# Patient Record
Sex: MALE | Race: WHITE | Employment: OTHER | URBAN - METROPOLITAN AREA
[De-identification: names, ages, dates, MRNs, and addresses within clinical notes are randomized per-mention and may not be internally consistent; named-entity substitution may affect disease eponyms.]

---

## 2021-03-22 ENCOUNTER — APPOINTMENT (OUTPATIENT)
Dept: CT IMAGING | Age: 71
DRG: 663 | End: 2021-03-22
Payer: OTHER GOVERNMENT

## 2021-03-22 ENCOUNTER — HOSPITAL ENCOUNTER (INPATIENT)
Age: 71
LOS: 4 days | Discharge: HOME | DRG: 663 | End: 2021-03-26
Attending: EMERGENCY MEDICINE | Admitting: INTERNAL MEDICINE
Payer: OTHER GOVERNMENT

## 2021-03-22 DIAGNOSIS — N39.0 URINARY TRACT INFECTION WITH HEMATURIA, SITE UNSPECIFIED: ICD-10-CM

## 2021-03-22 DIAGNOSIS — R31.9 HEMATURIA, UNSPECIFIED TYPE: Primary | ICD-10-CM

## 2021-03-22 DIAGNOSIS — R31.9 URINARY TRACT INFECTION WITH HEMATURIA, SITE UNSPECIFIED: ICD-10-CM

## 2021-03-22 PROBLEM — Z90.2 S/P LOBECTOMY OF LUNG: Status: ACTIVE | Noted: 2021-03-22

## 2021-03-22 PROBLEM — N32.89 MASS OF URINARY BLADDER DETERMINED BY ULTRASOUND: Status: ACTIVE | Noted: 2021-03-22

## 2021-03-22 PROBLEM — N30.01 ACUTE CYSTITIS WITH HEMATURIA: Status: ACTIVE | Noted: 2021-03-22

## 2021-03-22 PROBLEM — Z87.19 H/O ULCERATIVE COLITIS: Status: ACTIVE | Noted: 2021-03-22

## 2021-03-22 PROBLEM — F43.10 PTSD (POST-TRAUMATIC STRESS DISORDER): Status: ACTIVE | Noted: 2021-03-22

## 2021-03-22 LAB
-: ABNORMAL
ABSOLUTE EOS #: 0.24 K/UL (ref 0–0.44)
ABSOLUTE IMMATURE GRANULOCYTE: 0.04 K/UL (ref 0–0.3)
ABSOLUTE LYMPH #: 1.89 K/UL (ref 1.1–3.7)
ABSOLUTE MONO #: 0.88 K/UL (ref 0.1–1.2)
AMORPHOUS: ABNORMAL
ANION GAP SERPL CALCULATED.3IONS-SCNC: 8 MMOL/L (ref 9–17)
BACTERIA: ABNORMAL
BASOPHILS # BLD: 1 % (ref 0–2)
BASOPHILS ABSOLUTE: 0.07 K/UL (ref 0–0.2)
BILIRUBIN URINE: NEGATIVE
BUN BLDV-MCNC: 16 MG/DL (ref 8–23)
BUN/CREAT BLD: 17 (ref 9–20)
CALCIUM SERPL-MCNC: 9.4 MG/DL (ref 8.6–10.4)
CASTS UA: ABNORMAL /LPF
CHLORIDE BLD-SCNC: 100 MMOL/L (ref 98–107)
CO2: 21 MMOL/L (ref 20–31)
COLOR: ABNORMAL
COMMENT UA: ABNORMAL
CREAT SERPL-MCNC: 0.92 MG/DL (ref 0.7–1.2)
CRYSTALS, UA: ABNORMAL /HPF
DIFFERENTIAL TYPE: ABNORMAL
EOSINOPHILS RELATIVE PERCENT: 2 % (ref 1–4)
EPITHELIAL CELLS UA: ABNORMAL /HPF (ref 0–5)
GFR AFRICAN AMERICAN: >60 ML/MIN
GFR NON-AFRICAN AMERICAN: >60 ML/MIN
GFR SERPL CREATININE-BSD FRML MDRD: ABNORMAL ML/MIN/{1.73_M2}
GFR SERPL CREATININE-BSD FRML MDRD: ABNORMAL ML/MIN/{1.73_M2}
GLUCOSE BLD-MCNC: 85 MG/DL (ref 70–99)
GLUCOSE URINE: ABNORMAL
HCT VFR BLD CALC: 35.8 % (ref 40.7–50.3)
HEMOGLOBIN: 12.3 G/DL (ref 13–17)
IMMATURE GRANULOCYTES: 0 %
INR BLD: 0.9
KETONES, URINE: ABNORMAL
LEUKOCYTE ESTERASE, URINE: ABNORMAL
LYMPHOCYTES # BLD: 19 % (ref 24–43)
MCH RBC QN AUTO: 32.4 PG (ref 25.2–33.5)
MCHC RBC AUTO-ENTMCNC: 34.4 G/DL (ref 28.4–34.8)
MCV RBC AUTO: 94.2 FL (ref 82.6–102.9)
MONOCYTES # BLD: 9 % (ref 3–12)
MUCUS: ABNORMAL
NITRITE, URINE: POSITIVE
NRBC AUTOMATED: 0 PER 100 WBC
OTHER OBSERVATIONS UA: ABNORMAL
PDW BLD-RTO: 12.3 % (ref 11.8–14.4)
PH UA: 8 (ref 5–8)
PLATELET # BLD: 185 K/UL (ref 138–453)
PLATELET ESTIMATE: ABNORMAL
PMV BLD AUTO: 9.3 FL (ref 8.1–13.5)
POTASSIUM SERPL-SCNC: 4.2 MMOL/L (ref 3.7–5.3)
PROCALCITONIN: 0.03 NG/ML
PROTEIN UA: ABNORMAL
PROTHROMBIN TIME: 12.4 SEC (ref 11.5–14.2)
RBC # BLD: 3.8 M/UL (ref 4.21–5.77)
RBC # BLD: ABNORMAL 10*6/UL
RBC UA: ABNORMAL /HPF (ref 0–2)
RENAL EPITHELIAL, UA: ABNORMAL /HPF
SEG NEUTROPHILS: 69 % (ref 36–65)
SEGMENTED NEUTROPHILS ABSOLUTE COUNT: 6.7 K/UL (ref 1.5–8.1)
SODIUM BLD-SCNC: 129 MMOL/L (ref 135–144)
SPECIFIC GRAVITY UA: 1.01 (ref 1–1.03)
TRICHOMONAS: ABNORMAL
TURBIDITY: ABNORMAL
URINE HGB: ABNORMAL
UROBILINOGEN, URINE: ABNORMAL
WBC # BLD: 9.8 K/UL (ref 3.5–11.3)
WBC # BLD: ABNORMAL 10*3/UL
WBC UA: ABNORMAL /HPF (ref 0–5)
YEAST: ABNORMAL

## 2021-03-22 PROCEDURE — 81001 URINALYSIS AUTO W/SCOPE: CPT

## 2021-03-22 PROCEDURE — 85025 COMPLETE CBC W/AUTO DIFF WBC: CPT

## 2021-03-22 PROCEDURE — 99223 1ST HOSP IP/OBS HIGH 75: CPT | Performed by: INTERNAL MEDICINE

## 2021-03-22 PROCEDURE — 2580000003 HC RX 258: Performed by: NURSE PRACTITIONER

## 2021-03-22 PROCEDURE — 36415 COLL VENOUS BLD VENIPUNCTURE: CPT

## 2021-03-22 PROCEDURE — 85610 PROTHROMBIN TIME: CPT

## 2021-03-22 PROCEDURE — 87086 URINE CULTURE/COLONY COUNT: CPT

## 2021-03-22 PROCEDURE — APPSS45 APP SPLIT SHARED TIME 31-45 MINUTES: Performed by: NURSE PRACTITIONER

## 2021-03-22 PROCEDURE — 6360000002 HC RX W HCPCS: Performed by: NURSE PRACTITIONER

## 2021-03-22 PROCEDURE — 80048 BASIC METABOLIC PNL TOTAL CA: CPT

## 2021-03-22 PROCEDURE — 74176 CT ABD & PELVIS W/O CONTRAST: CPT

## 2021-03-22 PROCEDURE — 51702 INSERT TEMP BLADDER CATH: CPT

## 2021-03-22 PROCEDURE — 99283 EMERGENCY DEPT VISIT LOW MDM: CPT

## 2021-03-22 PROCEDURE — 6370000000 HC RX 637 (ALT 250 FOR IP): Performed by: NURSE PRACTITIONER

## 2021-03-22 PROCEDURE — 87040 BLOOD CULTURE FOR BACTERIA: CPT

## 2021-03-22 PROCEDURE — 1200000000 HC SEMI PRIVATE

## 2021-03-22 PROCEDURE — 84145 PROCALCITONIN (PCT): CPT

## 2021-03-22 RX ORDER — PAROXETINE HYDROCHLORIDE 40 MG/1
40 TABLET, FILM COATED ORAL EVERY MORNING
COMMUNITY

## 2021-03-22 RX ORDER — METOPROLOL TARTRATE 50 MG/1
50 TABLET, FILM COATED ORAL 2 TIMES DAILY
Status: DISCONTINUED | OUTPATIENT
Start: 2021-03-22 | End: 2021-03-26 | Stop reason: HOSPADM

## 2021-03-22 RX ORDER — LIDOCAINE HYDROCHLORIDE 20 MG/ML
5 JELLY TOPICAL PRN
Status: DISCONTINUED | OUTPATIENT
Start: 2021-03-22 | End: 2021-03-26 | Stop reason: HOSPADM

## 2021-03-22 RX ORDER — ASPIRIN 81 MG/1
81 TABLET ORAL DAILY
Status: ON HOLD | COMMUNITY
End: 2021-03-26 | Stop reason: HOSPADM

## 2021-03-22 RX ORDER — PROMETHAZINE HYDROCHLORIDE 12.5 MG/1
12.5 TABLET ORAL EVERY 6 HOURS PRN
Status: DISCONTINUED | OUTPATIENT
Start: 2021-03-22 | End: 2021-03-26 | Stop reason: HOSPADM

## 2021-03-22 RX ORDER — SULFASALAZINE 500 MG/1
1000 TABLET ORAL 2 TIMES DAILY
Status: DISCONTINUED | OUTPATIENT
Start: 2021-03-22 | End: 2021-03-26 | Stop reason: HOSPADM

## 2021-03-22 RX ORDER — LISINOPRIL 20 MG/1
20 TABLET ORAL DAILY
COMMUNITY

## 2021-03-22 RX ORDER — POTASSIUM CHLORIDE 7.45 MG/ML
10 INJECTION INTRAVENOUS PRN
Status: DISCONTINUED | OUTPATIENT
Start: 2021-03-22 | End: 2021-03-26 | Stop reason: HOSPADM

## 2021-03-22 RX ORDER — SODIUM CHLORIDE 0.9 % (FLUSH) 0.9 %
10 SYRINGE (ML) INJECTION PRN
Status: DISCONTINUED | OUTPATIENT
Start: 2021-03-22 | End: 2021-03-26 | Stop reason: HOSPADM

## 2021-03-22 RX ORDER — ACETAMINOPHEN 325 MG/1
650 TABLET ORAL EVERY 6 HOURS PRN
Status: DISCONTINUED | OUTPATIENT
Start: 2021-03-22 | End: 2021-03-26 | Stop reason: HOSPADM

## 2021-03-22 RX ORDER — MONTELUKAST SODIUM 4 MG/1
5 TABLET, CHEWABLE ORAL 2 TIMES DAILY
COMMUNITY

## 2021-03-22 RX ORDER — ONDANSETRON 2 MG/ML
4 INJECTION INTRAMUSCULAR; INTRAVENOUS EVERY 6 HOURS PRN
Status: DISCONTINUED | OUTPATIENT
Start: 2021-03-22 | End: 2021-03-26 | Stop reason: HOSPADM

## 2021-03-22 RX ORDER — SODIUM CHLORIDE 9 MG/ML
INJECTION, SOLUTION INTRAVENOUS CONTINUOUS
Status: DISCONTINUED | OUTPATIENT
Start: 2021-03-22 | End: 2021-03-26 | Stop reason: HOSPADM

## 2021-03-22 RX ORDER — DOXAZOSIN 2 MG/1
2 TABLET ORAL NIGHTLY
COMMUNITY

## 2021-03-22 RX ORDER — OMEGA-3 FATTY ACIDS CAP DELAYED RELEASE 1000 MG 1000 MG
1000 CAPSULE DELAYED RELEASE ORAL 2 TIMES DAILY
COMMUNITY

## 2021-03-22 RX ORDER — MAGNESIUM SULFATE 1 G/100ML
1000 INJECTION INTRAVENOUS PRN
Status: DISCONTINUED | OUTPATIENT
Start: 2021-03-22 | End: 2021-03-26 | Stop reason: HOSPADM

## 2021-03-22 RX ORDER — DOXAZOSIN MESYLATE 1 MG/1
1 TABLET ORAL NIGHTLY
Status: DISCONTINUED | OUTPATIENT
Start: 2021-03-22 | End: 2021-03-26 | Stop reason: HOSPADM

## 2021-03-22 RX ORDER — POLYETHYLENE GLYCOL 3350 17 G/17G
17 POWDER, FOR SOLUTION ORAL DAILY PRN
Status: DISCONTINUED | OUTPATIENT
Start: 2021-03-22 | End: 2021-03-26 | Stop reason: HOSPADM

## 2021-03-22 RX ORDER — SULFASALAZINE 500 MG/1
1000 TABLET ORAL 2 TIMES DAILY
COMMUNITY

## 2021-03-22 RX ORDER — PAROXETINE HYDROCHLORIDE 20 MG/1
40 TABLET, FILM COATED ORAL EVERY MORNING
Status: DISCONTINUED | OUTPATIENT
Start: 2021-03-23 | End: 2021-03-26 | Stop reason: HOSPADM

## 2021-03-22 RX ORDER — ACETAMINOPHEN 650 MG/1
650 SUPPOSITORY RECTAL EVERY 6 HOURS PRN
Status: DISCONTINUED | OUTPATIENT
Start: 2021-03-22 | End: 2021-03-26 | Stop reason: HOSPADM

## 2021-03-22 RX ORDER — LISINOPRIL 20 MG/1
20 TABLET ORAL DAILY
Status: DISCONTINUED | OUTPATIENT
Start: 2021-03-22 | End: 2021-03-26 | Stop reason: HOSPADM

## 2021-03-22 RX ORDER — SODIUM CHLORIDE 0.9 % (FLUSH) 0.9 %
10 SYRINGE (ML) INJECTION EVERY 12 HOURS SCHEDULED
Status: DISCONTINUED | OUTPATIENT
Start: 2021-03-22 | End: 2021-03-26 | Stop reason: HOSPADM

## 2021-03-22 RX ORDER — MORPHINE SULFATE 2 MG/ML
2 INJECTION, SOLUTION INTRAMUSCULAR; INTRAVENOUS EVERY 4 HOURS PRN
Status: DISCONTINUED | OUTPATIENT
Start: 2021-03-22 | End: 2021-03-26 | Stop reason: HOSPADM

## 2021-03-22 RX ORDER — POTASSIUM CHLORIDE 20 MEQ/1
40 TABLET, EXTENDED RELEASE ORAL PRN
Status: DISCONTINUED | OUTPATIENT
Start: 2021-03-22 | End: 2021-03-26 | Stop reason: HOSPADM

## 2021-03-22 RX ORDER — NICOTINE 21 MG/24HR
1 PATCH, TRANSDERMAL 24 HOURS TRANSDERMAL DAILY PRN
Status: DISCONTINUED | OUTPATIENT
Start: 2021-03-22 | End: 2021-03-26 | Stop reason: HOSPADM

## 2021-03-22 RX ORDER — METOPROLOL TARTRATE 50 MG/1
50 TABLET, FILM COATED ORAL 2 TIMES DAILY
COMMUNITY

## 2021-03-22 RX ADMIN — METOPROLOL TARTRATE 50 MG: 50 TABLET, FILM COATED ORAL at 19:38

## 2021-03-22 RX ADMIN — SULFASALAZINE 1000 MG: 500 TABLET ORAL at 19:38

## 2021-03-22 RX ADMIN — SODIUM CHLORIDE: 9 INJECTION, SOLUTION INTRAVENOUS at 23:00

## 2021-03-22 RX ADMIN — DOXAZOSIN 1 MG: 1 TABLET ORAL at 19:38

## 2021-03-22 RX ADMIN — CEFTRIAXONE SODIUM 1000 MG: 1 INJECTION, POWDER, FOR SOLUTION INTRAMUSCULAR; INTRAVENOUS at 17:36

## 2021-03-22 RX ADMIN — MORPHINE SULFATE 2 MG: 2 INJECTION, SOLUTION INTRAMUSCULAR; INTRAVENOUS at 19:38

## 2021-03-22 SDOH — HEALTH STABILITY: MENTAL HEALTH: HOW MANY STANDARD DRINKS CONTAINING ALCOHOL DO YOU HAVE ON A TYPICAL DAY?: 1 OR 2

## 2021-03-22 ASSESSMENT — PAIN DESCRIPTION - ONSET: ONSET: SUDDEN

## 2021-03-22 ASSESSMENT — ENCOUNTER SYMPTOMS
NAUSEA: 0
SINUS PRESSURE: 0
COUGH: 0
SHORTNESS OF BREATH: 0
WHEEZING: 0
ABDOMINAL DISTENTION: 1
CONSTIPATION: 0
EYE DISCHARGE: 0
SORE THROAT: 0
DIARRHEA: 0
SINUS PAIN: 0
RHINORRHEA: 0
ABDOMINAL PAIN: 1
EYE REDNESS: 0
ABDOMINAL PAIN: 0
VOMITING: 0
COLOR CHANGE: 0

## 2021-03-22 ASSESSMENT — PAIN DESCRIPTION - PROGRESSION: CLINICAL_PROGRESSION: GRADUALLY IMPROVING

## 2021-03-22 ASSESSMENT — PAIN DESCRIPTION - FREQUENCY: FREQUENCY: CONTINUOUS

## 2021-03-22 ASSESSMENT — PAIN DESCRIPTION - PAIN TYPE: TYPE: ACUTE PAIN

## 2021-03-22 ASSESSMENT — PAIN SCALES - GENERAL
PAINLEVEL_OUTOF10: 5
PAINLEVEL_OUTOF10: 6

## 2021-03-22 NOTE — CONSULTS
Stephie Villatoro  Urology Consultation    Patient:  Peace Willis  MRN: [id number]  YOB: 1950    CHIEF COMPLAINT: Hemorrhagic cystitis, urinary retention, questionable bladder mass, solitary kidney, prior nephrectomy    HISTORY OF PRESENT ILLNESS:   The patient is a 79 y.o. male who presents with gross hematuria, patient having indwelling Stone at the present time with bladder irrigation for gross hematuria    CT imaging reviewed, the patient has a solitary right kidney, with moderate hydronephrosis and mild hydroureter without obstruction. A swirling moderately hyperdense material in the urinary bladder measuring 8 cm in the dependent part of the urinary bladder, hematoma versus mass. The patient has kept follow-ups at the [facility] in [place], we discussed with the patient the episode of bleeding the need for cystoscopy. He would like to continue his follow-up up [facility],    At the present time Stone catheter in place with bladder irrigation, findings of blood clots most likely in the bladder    Patient's old records, notes and chart reviewed and summarized above.     Past Medical History:    Past Medical History:   Diagnosis Date    Arthritis     CAD (coronary artery disease)     Cancer ([address]     Chronic kidney disease     Depression     History of kidney cancer     Hyperlipidemia     Hypertension        Past Surgical History:    Past Surgical History:   Procedure Laterality Date    CARDIAC SURGERY      CHOLECYSTECTOMY      CORONARY ANGIOPLASTY WITH STENT PLACEMENT      EYE SURGERY      KIDNEY REMOVAL Left     KNEE SURGERY      LUNG REMOVAL, PARTIAL Right     middle lob    SKIN BIOPSY       Previous  surgery: Nephrectomy, patient follows up at the [facility] in [place]     Medications:    Scheduled Meds:   cefTRIAXone (ROCEPHIN) IV  1,000 mg Intravenous Once    [START ON 3/23/2021] cefTRIAXone (ROCEPHIN) IV  1,000 mg Intravenous Q24H     Continuous Infusions:  PRN Meds:.lidocaine    Allergies:  Patient has no known allergies.     Social History:    Social History     Socioeconomic History    Marital status:      Spouse name: Not on file    Number of children: Not on file    Years of education: Not on file    Highest education level: Not on file   Occupational History    Not on file   Social Needs    Financial resource strain: Not on file    Food insecurity     Worry: Not on file     Inability: Not on file    Transportation needs     Medical: Not on file     Non-medical: Not on file   Tobacco Use    Smoking status: Current Every Day Smoker     Packs/day: 1.00    Smokeless tobacco: Never Used   Substance and Sexual Activity    Alcohol use: Yes     Frequency: Monthly or less     Drinks per session: 1 or 2     Comment: socially    Drug use: Never    Sexual activity: Not on file   Lifestyle    Physical activity     Days per week: Not on file     Minutes per session: Not on file    Stress: Not on file   Relationships    Social connections     Talks on phone: Not on file     Gets together: Not on file     Attends Restorationist service: Not on file     Active member of club or organization: Not on file     Attends meetings of clubs or organizations: Not on file     Relationship status: Not on file    Intimate partner violence     Fear of current or ex partner: Not on file     Emotionally abused: Not on file     Physically abused: Not on file     Forced sexual activity: Not on file   Other Topics Concern    Not on file   Social History Narrative    Not on file       Family History:    Family History   Problem Relation Age of Onset    High Blood Pressure Mother     Coronary Art Dis Father     Heart Attack Father     High Blood Pressure Father     Diabetes Father      Previous Urologic Family history: none    REVIEW OF SYSTEMS:  Constitutional: negative  Eyes: negative  Respiratory: negative  Cardiovascular: negative  Gastrointestinal: negative  Genitourinary: see HPI  Musculoskeletal: negative  Skin: negative   Neurological: negative  Hematological/Lymphatic: negative  Psychological: negative    Physical Exam:    This a 79 y.o. male   Patient Vitals for the past 24 hrs:   BP Temp Temp src Pulse Resp SpO2 Height Weight   03/22/21 1326 (!) 152/72 97.7 °F (36.5 °C) Oral 63 18 98 % -- --   03/22/21 1324 -- -- -- -- -- -- 5' 6\" (1.676 m) 202 lb (91.6 kg)     Constitutional: Patient in no acute distress; Neuro: alert and oriented to person place and time. Psych: Mood and affect normal.  Skin: Normal  Lungs: Respiratory effort normal  Cardiovascular:  Normal peripheral pulses  Abdomen: Soft, non-tender, non-distended with no CVA, flank pain, hepatosplenomegaly or hernia. Kidneys normal.  Bladder non-tender and not distended.   Lymphatics: no palpable lymphadenopathy  Penis normal and circumcised  Urethral meatus normal  Scrotal exam normal  Stone catheter in place, bladder decompressed, bladder irrigation in progress, urine still retention, no clots  LABS:  Recent Labs     03/22/21  1445   WBC 9.8   HGB 12.3*   HCT 35.8*   MCV 94.2        Recent Labs     03/22/21  1445   *   K 4.2      CO2 21   BUN 16   CREATININE 0.92     No results found for: PSA    Additional Lab/culture results:    Urinalysis:   Recent Labs     03/22/21  1350   COLORU RED*   PHUR 8.0   WBCUA 10 TO 20   RBCUA TOO NUMEROUS TO COUNT   MUCUS NOT REPORTED   TRICHOMONAS NOT REPORTED   YEAST NOT REPORTED   BACTERIA NOT REPORTED   SPECGRAV 1.010   LEUKOCYTESUR LARGE*   UROBILINOGEN ELEVATED*   BILIRUBINUR NEGATIVE        -----------------------------------------------------------------  Imaging Results:    Assessment and Plan   Impression:    Patient Active Problem List   Diagnosis    History of kidney cancer    Chronic kidney disease    CAD (coronary artery disease)    Depression    Hyperlipidemia    Hypertension    H/O ulcerative colitis    Acute cystitis with hematuria    PTSD (post-traumatic stress disorder)    S/P lobectomy of lung    Hematoma versus mass of urinary bladder determined by ultrasound    Hematuria       Plan: We will continue with bladder irrigation today, the option of catheter removal when the urine is clear and follow-up cystoscopy in [facility] at the [address] [place] [address] can be exercised since the patient has had his follow-ups up [facility].     Alternatively a cystoscopy could be done locally depending on the patient's symptoms we will follow closely    Ricardo Miranda  5:38 PM 3/22/2021

## 2021-03-22 NOTE — ED PROVIDER NOTES
eMERGENCY dEPARTMENT eNCOUnter   Independent Attestation     Pt Name: Nancy Madrigal  MRN: [id number]  [place] 1950  Date of evaluation: 3/22/21     Nancy Madrigal is a 79 y.o. male with CC: Abdominal Pain (onset 1000) and Hematuria      Based on the medical record the care appears appropriate. I was personally available for consultation in the Emergency Department.     Elena Polanco MD  Attending Emergency Physician                    Elena Polanco MD  03/22/21 9093

## 2021-03-22 NOTE — PROGRESS NOTES
Admitted to room 2022 from ER, oriented to room and equipment. Dr Radha Bliss at bedside. 3 way morales in place with saline CBI infusing.  Returns are cherry colored

## 2021-03-22 NOTE — H&P
[facility]  Office: [address], DO, Savage Ledesma, DO, Francisca Moeller, DO, williams Arely Ferguson, DO, Peterson Fields MD, Oddis Nissen, MD, Jessica Cunningham MD, Candace Conway MD, Jocelin Morelos MD, Mario Kan MD, Anna Resendez MD, Carlos Monahan MD, Mahnaz Garland MD, Idris Victoria DO, Eloisa Solomon MD, Juan C Block, DO, Arianna Bonilla MD,  Zahra Chaudhary DO, Santiago Gomez MD, Kay Acuna MD, Brigida Paul [facility], [facility], CNP, Raheel Roche, CNP, Riri Ibarra, CNS, Stephanie Barnett, CNP, Archana Franklin, CNP, Diana Cox, CNP, Zoe Greenwood, [facility], Vivienne Downing, CNP, Ed Everett PA-C, Sandra Ladd, APRIL, Yajaira Royal, CNP, Suha Hatch, CNP, Melissa Maloney, CNP, Yue Patiño, CNP, Mukund Moore, CNP, Benny Gayle, CNP         [facility] [address]    HISTORY AND PHYSICAL EXAMINATION            Date:   3/22/2021  Patient name:  Peace Willis  Date of admission:  3/22/2021  1:56 PM  MRN:   [id number]  Account:  [de-identified]  YOB: 1950  PCP:    No primary care provider on file. Room:   [place] [address]  Code Status:    No Order    Chief Complaint:     Chief Complaint   Patient presents with    Abdominal Pain     onset 1000    Hematuria       History Obtained From:     patient    History of Present Illness:     Peace Willis is a 79 y.o. Non-/non  male who presents with Abdominal Pain (onset 1000) and Hematuria   and is admitted to the hospital for the management of Acute cystitis with hematuria. Patient reports to the hospital with abdominal pain and hematuria. Patient reports that he has a long history of urinary system complications. Patient has a history of kidney cancer resulting in nephrectomy of the left as well as a persistent renal cyst to the right kidney that gets routine surveillance scans. Patient also has a history of prostate enlargement requiring TURP.   [facility] completed by [place] [facility]. Patient reports that at approximately 10 AM he went to urinate and appreciated a large amount of hematuria including mary blood as well as large clots. Shortly thereafter patient developed suprapubic abdominal pain that he rated at severe and would come and go in waves. Patient reported the emergency department where a CT scan was completed and questionable bladder hematoma versus mass was identified. Stone catheter was placed and patient was placed on three-way irrigation. Urinalysis reveals UTI. At the time my exam patient was resting comfortably. Three-way Stone catheter is in place. Patient has large amount of mary blood irrigating out the catheter. IV antibiotics have been initiated. Urology consultation was obtained and we await their input. Past Medical History:     Past Medical History:   Diagnosis Date    Arthritis     CAD (coronary artery disease)     [facility] ([facility] [address]     Chronic kidney disease     Depression     History of kidney cancer     Hyperlipidemia     Hypertension         Past Surgical History:     Past Surgical History:   Procedure Laterality Date    CARDIAC SURGERY      CHOLECYSTECTOMY      CORONARY ANGIOPLASTY WITH STENT PLACEMENT      EYE SURGERY      KIDNEY REMOVAL Left     KNEE SURGERY      LUNG REMOVAL, PARTIAL Right     middle lob    SKIN BIOPSY          Medications Prior to Admission:     Prior to Admission medications    Medication Sig Start Date End Date Taking?  Authorizing Provider   aspirin 81 MG EC tablet Take 81 mg by mouth daily   Yes Historical Provider, MD   Omega-3 Fatty Acids (FISH OIL) 1000 MG CPDR Take 1,000 mg by mouth daily   Yes Historical Provider, MD   PARoxetine (PAXIL) 40 MG tablet Take 40 mg by mouth every morning   Yes Historical Provider, MD   metoprolol tartrate (LOPRESSOR) 50 MG tablet Take 50 mg by mouth 2 times daily   Yes Historical Provider, MD   lisinopril (PRINIVIL;ZESTRIL) 20 MG tablet Take 20 mg by mouth daily   Yes Historical Provider, MD   sulfaSALAzine (SULFAZINE PO) Take 2 tablets by mouth every morning   Yes Historical Provider, MD   colestipol (COLESTID) 5 g packet Take 5 g by mouth 2 times daily   Yes Historical Provider, MD   Ascorbic Acid (VITAMIN C PO) Take by mouth   Yes Historical Provider, MD   VITAMIN D PO Take by mouth   Yes Historical Provider, MD   DOXAZOSIN MESYLATE PO Take by mouth nightly   Yes Historical Provider, MD   Multiple Vitamins-Minerals (MULTIVITAL PO) Take by mouth   Yes Historical Provider, MD        Allergies:     Patient has no known allergies. Social History:     Tobacco:    reports that he has been smoking. He has been smoking about 1.00 pack per day. He has never used smokeless tobacco.  Alcohol:      reports current alcohol use. Drug Use:  reports no history of drug use. Family History:     Family History   Problem Relation Age of Onset    High Blood Pressure Mother     Coronary Art Dis Father     Heart Attack Father     High Blood Pressure Father     Diabetes Father        Review of Systems:     Positive and Negative as described in HPI. Review of Systems   Constitutional: Negative for activity change, appetite change, fatigue and fever. HENT: Negative for congestion, sinus pressure and sinus pain. Eyes: Negative for discharge, redness and visual disturbance. Respiratory: Negative for shortness of breath and wheezing. Cardiovascular: Negative for chest pain, palpitations and leg swelling. Gastrointestinal: Positive for abdominal distention and abdominal pain. Endocrine: Negative for cold intolerance and heat intolerance. Genitourinary: Positive for difficulty urinating, hematuria and urgency. Negative for flank pain. Musculoskeletal: Negative for arthralgias and myalgias. Skin: Negative for color change and rash.    Allergic/Immunologic: Negative for environmental allergies, food allergies and General: No swelling, tenderness or deformity. Skin:     General: Skin is warm and dry. Capillary Refill: Capillary refill takes less than 2 seconds. Coloration: Skin is not jaundiced or pale. Findings: No bruising. Neurological:      General: No focal deficit present. Mental Status: He is alert and oriented to person, place, and time. Mental status is at baseline. Psychiatric:         Mood and Affect: Mood normal.         Behavior: Behavior normal.         Investigations:      Laboratory Testing:  Recent Results (from the past 24 hour(s))   Urinalysis with Microscopic    Collection Time: 03/22/21  1:50 PM   Result Value Ref Range    Color, UA RED (A) YELLOW    Turbidity UA TURBID (A) CLEAR    Glucose, Ur 1+ (A) NEGATIVE    Bilirubin Urine NEGATIVE NEGATIVE    Ketones, Urine 2+ (A) NEGATIVE    Specific Gravity, UA 1.010 1.005 - 1.030    Urine Hgb 3+ (A) NEGATIVE    pH, UA 8.0 5.0 - 8.0    Protein, UA 3+ (A) NEGATIVE    Urobilinogen, Urine ELEVATED (A) Normal    Nitrite, Urine POSITIVE (A) NEGATIVE    Leukocyte Esterase, Urine LARGE (A) NEGATIVE    Urinalysis Comments NOT REPORTED     -          WBC, UA 10 TO 20 0 - 5 /HPF    RBC, UA TOO NUMEROUS TO COUNT 0 - 2 /HPF    Casts UA NOT REPORTED /LPF    Crystals, UA NOT REPORTED None /HPF    Epithelial Cells UA None 0 - 5 /HPF    Renal Epithelial, UA NOT REPORTED 0 /HPF    Bacteria, UA NOT REPORTED None    Mucus, UA NOT REPORTED None    Trichomonas, UA NOT REPORTED None    Amorphous, UA NOT REPORTED None    Other Observations UA NOT REPORTED NOT REQ.     Yeast, UA NOT REPORTED None   CBC Auto Differential    Collection Time: 03/22/21  2:45 PM   Result Value Ref Range    WBC 9.8 3.5 - 11.3 k/uL    RBC 3.80 (L) 4.21 - 5.77 m/uL    Hemoglobin 12.3 (L) 13.0 - 17.0 g/dL    Hematocrit 35.8 (L) 40.7 - 50.3 %    MCV 94.2 82.6 - 102.9 fL    MCH 32.4 25.2 - 33.5 pg    MCHC 34.4 28.4 - 34.8 g/dL    RDW 12.3 11.8 - 14.4 %    Platelets 407 858 - 178 k/uL    MPV 9.3 8.1 - 13.5 fL    NRBC Automated 0.0 0.0 per 100 WBC    Differential Type NOT REPORTED     Seg Neutrophils 69 (H) 36 - 65 %    Lymphocytes 19 (L) 24 - 43 %    Monocytes 9 3 - 12 %    Eosinophils % 2 1 - 4 %    Basophils 1 0 - 2 %    Immature Granulocytes 0 0 %    Segs Absolute 6.70 1.50 - 8.10 k/uL    Absolute Lymph # 1.89 1.10 - 3.70 k/uL    Absolute Mono # 0.88 0.10 - 1.20 k/uL    Absolute Eos # 0.24 0.00 - 0.44 k/uL    Basophils Absolute 0.07 0.00 - 0.20 k/uL    Absolute Immature Granulocyte 0.04 0.00 - 0.30 k/uL    WBC Morphology NOT REPORTED     RBC Morphology NOT REPORTED     Platelet Estimate NOT REPORTED    Basic Metabolic Panel    Collection Time: 03/22/21  2:45 PM   Result Value Ref Range    Glucose 85 70 - 99 mg/dL    BUN 16 8 - 23 mg/dL    CREATININE 0.92 0.70 - 1.20 mg/dL    Bun/Cre Ratio 17 9 - 20    Calcium 9.4 8.6 - 10.4 mg/dL    Sodium 129 (L) 135 - 144 mmol/L    Potassium 4.2 3.7 - 5.3 mmol/L    Chloride 100 98 - 107 mmol/L    CO2 21 20 - 31 mmol/L    Anion Gap 8 (L) 9 - 17 mmol/L    GFR Non-African American >60 >60 mL/min    GFR African American >60 >60 mL/min    GFR Comment          GFR Staging NOT REPORTED    Protime-INR    Collection Time: 03/22/21  2:45 PM   Result Value Ref Range    Protime 12.4 11.5 - 14.2 sec    INR 0.9        Imaging/Diagnostics:  Ct Abdomen Pelvis Wo Contrast Additional Contrast? None    Result Date: 3/22/2021  1. Solitary right kidney demonstrating moderate hydronephrosis and mild hydroureter without obstructing ureteric lesion. 2. A swirling moderately hyperdense material in the urinary bladder about 8 x 8 cm in the dependent portion. Hematoma versus mass with hemorrhage. 3. Mild prostatomegaly.        Assessment :      Hospital Problems           Last Modified POA    * (Principal) Acute cystitis with hematuria 3/22/2021 Yes    History of kidney cancer 3/22/2021 Yes    Chronic kidney disease 3/22/2021 Yes    CAD (coronary artery disease) 3/22/2021 Yes Depression 3/22/2021 Yes    Hyperlipidemia 3/22/2021 Yes    Hypertension 3/22/2021 Yes    H/O ulcerative colitis 3/22/2021 Yes    PTSD (post-traumatic stress disorder) 3/22/2021 Yes    S/P lobectomy of lung 3/22/2021 Yes          Plan:     Patient status inpatient in the  Med/[facility]ge    1. Acute cystitis with hematuria and urinary retention  1. IV hydration with IV Rocephin every 24 hours  2. Maintain three-way Stone catheter and irrigation as directed by urology  3. Urology consultation and anticipate cystoscopy, patient is to be n.p.o. after midnight  4. Hold anticoagulation due to evidence of active hemorrhage, no DVT prophylaxis, hold aspirin for now. 2. Known history of kidney cancer and prostate enlargement  1. Urology consultation to direct further evaluation  3. CKD  1. Closely monitor renal function and provide IV hydration   2. If renal function deteriorates hold lisinopril  4. CAD  1. Education on the need for tobacco abstinence  2. Hold aspirin due to evidence for active hemorrhage  5. Depression and PTSD  1. Paxil  6. Hypertension and hyperlipidemia  1. Lisinopril, metoprolol, hold aspirin  2. Education on need for diet lifestyle modifications including low-fat low sugar diet and smoking abstinence  7. Ulcerative colitis  1. SulfaSALAZINE      Consultations:   IP CONSULT TO UROLOGY  IP CONSULT TO INTERNAL MEDICINE    Patient is admitted as inpatient status because of co-morbidities listed above, severity of signs and symptoms as outlined, requirement for current medical therapies and most importantly because of direct risk to patient if care not provided in a hospital setting. Expected length of stay > 48 hours. DIXON Morrison NP  3/22/2021  5:02 PM    Copy sent to Dr. White Chimera primary care provider on file.

## 2021-03-22 NOTE — ED NOTES
Attempted to call report at this time per Genet George RN will call back as soon as she is available.      Claudeen Rota, RN  03/22/21 3644

## 2021-03-23 LAB
ANION GAP SERPL CALCULATED.3IONS-SCNC: 9 MMOL/L
BUN BLDV-MCNC: 15 MG/DL (ref 8–23)
BUN/CREAT BLD: 16 (ref 9–20)
CALCIUM SERPL-MCNC: 9 MG/DL (ref 8.6–10.4)
CHLORIDE BLD-SCNC: 108 MMOL/L (ref 98–107)
CO2: 23 MMOL/L (ref 20–31)
CREAT SERPL-MCNC: 0.91 MG/DL (ref 0.7–1.2)
CULTURE: NO GROWTH
GFR AFRICAN AMERICAN: >60 ML/MIN
GFR NON-AFRICAN AMERICAN: >60 ML/MIN
GFR SERPL CREATININE-BSD FRML MDRD: ABNORMAL ML/MIN/{1.73_M2}
GFR SERPL CREATININE-BSD FRML MDRD: ABNORMAL ML/MIN/{1.73_M2}
GLUCOSE BLD-MCNC: 105 MG/DL (ref 70–99)
HCT VFR BLD CALC: 33.5 % (ref 40.7–50.3)
HEMOGLOBIN: 11.4 G/DL (ref 13–17)
INR BLD: 1.1
Lab: NORMAL
MCH RBC QN AUTO: 31.8 PG (ref 25.2–33.5)
MCHC RBC AUTO-ENTMCNC: 34 G/DL (ref 28.4–34.8)
MCV RBC AUTO: 93.3 FL (ref 82.6–102.9)
NRBC AUTOMATED: 0 PER 100 WBC
PDW BLD-RTO: 12.2 % (ref 11.8–14.4)
PLATELET # BLD: 192 K/UL (ref 138–453)
PMV BLD AUTO: 9.5 FL (ref 8.1–13.5)
POTASSIUM SERPL-SCNC: 4.2 MMOL/L (ref 3.7–5.3)
PROTHROMBIN TIME: 13.8 SEC (ref 11.5–14.2)
RBC # BLD: 3.59 M/UL (ref 4.21–5.77)
SARS-COV-2, RAPID: NOT DETECTED
SODIUM BLD-SCNC: 140 MMOL/L (ref 135–144)
SPECIMEN DESCRIPTION: NORMAL
SPECIMEN DESCRIPTION: NORMAL
WBC # BLD: 12.3 K/UL (ref 3.5–11.3)

## 2021-03-23 PROCEDURE — 97530 THERAPEUTIC ACTIVITIES: CPT

## 2021-03-23 PROCEDURE — 51700 IRRIGATION OF BLADDER: CPT

## 2021-03-23 PROCEDURE — 85027 COMPLETE CBC AUTOMATED: CPT

## 2021-03-23 PROCEDURE — 2709999900 HC NON-CHARGEABLE SUPPLY: Performed by: UROLOGY

## 2021-03-23 PROCEDURE — 88120 CYTP URNE 3-5 PROBES EA SPEC: CPT

## 2021-03-23 PROCEDURE — 2580000003 HC RX 258: Performed by: UROLOGY

## 2021-03-23 PROCEDURE — 2580000003 HC RX 258: Performed by: NURSE PRACTITIONER

## 2021-03-23 PROCEDURE — 6370000000 HC RX 637 (ALT 250 FOR IP): Performed by: NURSE PRACTITIONER

## 2021-03-23 PROCEDURE — 97162 PT EVAL MOD COMPLEX 30 MIN: CPT

## 2021-03-23 PROCEDURE — APPSS45 APP SPLIT SHARED TIME 31-45 MINUTES: Performed by: NURSE PRACTITIONER

## 2021-03-23 PROCEDURE — 88304 TISSUE EXAM BY PATHOLOGIST: CPT

## 2021-03-23 PROCEDURE — 99232 SBSQ HOSP IP/OBS MODERATE 35: CPT | Performed by: FAMILY MEDICINE

## 2021-03-23 PROCEDURE — 3600000012 HC SURGERY LEVEL 2 ADDTL 15MIN: Performed by: UROLOGY

## 2021-03-23 PROCEDURE — 6370000000 HC RX 637 (ALT 250 FOR IP): Performed by: UROLOGY

## 2021-03-23 PROCEDURE — 36415 COLL VENOUS BLD VENIPUNCTURE: CPT

## 2021-03-23 PROCEDURE — 3600000002 HC SURGERY LEVEL 2 BASE: Performed by: UROLOGY

## 2021-03-23 PROCEDURE — 1200000000 HC SEMI PRIVATE

## 2021-03-23 PROCEDURE — 51702 INSERT TEMP BLADDER CATH: CPT

## 2021-03-23 PROCEDURE — 80048 BASIC METABOLIC PNL TOTAL CA: CPT

## 2021-03-23 PROCEDURE — 0TCB8ZZ EXTIRPATION OF MATTER FROM BLADDER, VIA NATURAL OR ARTIFICIAL OPENING ENDOSCOPIC: ICD-10-PCS | Performed by: UROLOGY

## 2021-03-23 PROCEDURE — 87635 SARS-COV-2 COVID-19 AMP PRB: CPT

## 2021-03-23 PROCEDURE — 85610 PROTHROMBIN TIME: CPT

## 2021-03-23 PROCEDURE — 97116 GAIT TRAINING THERAPY: CPT

## 2021-03-23 RX ORDER — LIDOCAINE HYDROCHLORIDE 20 MG/ML
JELLY TOPICAL PRN
Status: DISCONTINUED | OUTPATIENT
Start: 2021-03-23 | End: 2021-03-23 | Stop reason: HOSPADM

## 2021-03-23 RX ORDER — LIDOCAINE HYDROCHLORIDE 20 MG/ML
JELLY TOPICAL PRN
Status: DISCONTINUED | OUTPATIENT
Start: 2021-03-23 | End: 2021-03-26 | Stop reason: HOSPADM

## 2021-03-23 RX ORDER — ASCORBIC ACID 500 MG
500 TABLET ORAL EVERY MORNING
COMMUNITY

## 2021-03-23 RX ORDER — TRAZODONE HYDROCHLORIDE 100 MG/1
100 TABLET ORAL NIGHTLY
COMMUNITY

## 2021-03-23 RX ADMIN — SULFASALAZINE 1000 MG: 500 TABLET ORAL at 20:33

## 2021-03-23 RX ADMIN — SODIUM CHLORIDE: 9 INJECTION, SOLUTION INTRAVENOUS at 20:39

## 2021-03-23 RX ADMIN — SODIUM CHLORIDE: 9 INJECTION, SOLUTION INTRAVENOUS at 05:24

## 2021-03-23 RX ADMIN — LIDOCAINE HYDROCHLORIDE: 20 JELLY TOPICAL at 11:49

## 2021-03-23 RX ADMIN — DOXAZOSIN 1 MG: 1 TABLET ORAL at 20:33

## 2021-03-23 RX ADMIN — METOPROLOL TARTRATE 50 MG: 50 TABLET, FILM COATED ORAL at 20:33

## 2021-03-23 RX ADMIN — LISINOPRIL 20 MG: 20 TABLET ORAL at 08:37

## 2021-03-23 RX ADMIN — PAROXETINE HYDROCHLORIDE 40 MG: 20 TABLET, FILM COATED ORAL at 08:37

## 2021-03-23 RX ADMIN — SULFASALAZINE 1000 MG: 500 TABLET ORAL at 08:37

## 2021-03-23 RX ADMIN — METOPROLOL TARTRATE 50 MG: 50 TABLET, FILM COATED ORAL at 08:37

## 2021-03-23 ASSESSMENT — PAIN SCALES - GENERAL
PAINLEVEL_OUTOF10: 0

## 2021-03-23 NOTE — PROGRESS NOTES
[facility]  Office: [phone]  Bre Love DO, Prasad Pinon DO, Kenton Jj, DO, Clark Ferguson, DO, Caitlin Rivas MD, Hakan Conrad MD, Erik Shin MD, Sotero Hendricks MD, Shimon Cardoso MD, Coleen Bliss MD, Fide Stanley MD, Mary Olmstead MD, Mahnaz Mercedes MD, Viral King DO, Melvin Concepcion MD, Kal Browning, DO, Mario Willett MD,  Maddie Ambriz DO, Janelle Ortega MD, Clemencia Menezes MD, Sophia Watts [facility], [facility], [facility], Jesús Ireland, CNP, Swati Israel, CNS, Mel Bartlett, CNP, Josef Gómez, CNP, Martita Boyer, CNP, Claritza Oscar, CNP, Rajani Garsia, CNP, Erick Boogie PA-C, Andrzej Garcia, [facility], Lilibeth Khan, CNP, Lois Rose, CNP, Maged Humphries, CNP, Tobi Clark, CNP, Michael Ramirez, [facility], Romero Acosta, [facility]    Progress Note    3/23/2021    11:28 AM    Name:   Fara Storm  MRN:     [id number]     Acct:      [de-identified]   Room:   2022/2022-01   Day:  1  Admit Date:  3/22/2021  1:56 PM    PCP:   No primary care provider on file. Code Status:  Full Code    Subjective:     C/C:   Chief Complaint   Patient presents with    Abdominal Pain     onset 1000    Hematuria     Interval History Status: not changed. Little change in condition. Patient continues to endorse suprapubic discomfort intermittently and his three-way Stone catheter routinely needs flushed due to clotting. Three-way saline return as bloody with clots. It is less hemoconcentrated than yesterday. Urology consultation is underway and we await their recommendations on his hospital course. Blood cultures and urine cultures are negative thus far, we await these reports. Brief History:     3/22 - Patient reports that at approximately 10 AM he went to urinate and appreciated a large amount of hematuria including mary blood as well as large clots.   Shortly thereafter patient developed suprapubic abdominal pain that he rated at severe and would come and go in waves. Patient reported the emergency department where a CT scan was completed and questionable bladder hematoma versus mass was identified. Stone catheter was placed and patient was placed on three-way irrigation. Urinalysis reveals UTI.     3/23 -Little change in condition. Three-way Stone catheter remains in place with bloody return. Blood and urine cultures negative thus far. We await urology direction on hospital course. Review of Systems:     Constitutional:  negative for chills, fevers, sweats  Respiratory:  negative for cough, dyspnea on exertion, shortness of breath, wheezing  Cardiovascular:  negative for chest pain, chest pressure/discomfort, lower extremity edema, palpitations  Gastrointestinal:  negative for abdominal pain, constipation, diarrhea, nausea, vomiting. Does endorse intermittent suprapubic pain when fully return becomes occluded. Neurological:  negative for dizziness, headache    Medications: Allergies:  No Known Allergies    Current Meds:   Scheduled Meds:    lisinopril  20 mg Oral Daily    metoprolol tartrate  50 mg Oral BID    PARoxetine  40 mg Oral QAM    sulfaSALAzine  1,000 mg Oral BID    doxazosin  1 mg Oral Nightly    sodium chloride flush  10 mL Intravenous 2 times per day    cefTRIAXone (ROCEPHIN) IV  1,000 mg Intravenous Q24H     Continuous Infusions:    sodium chloride 75 mL/hr at 03/23/21 0524     PRN Meds: lidocaine, lidocaine, sodium chloride flush, potassium chloride **OR** potassium alternative oral replacement **OR** potassium chloride, magnesium sulfate, promethazine **OR** ondansetron, polyethylene glycol, nicotine, acetaminophen **OR** acetaminophen, morphine    Data:     Past Medical History:   has a past medical history of Arthritis, CAD (coronary artery disease), Cancer ([facility] [address] Chronic kidney disease, Depression, History of kidney cancer, Hyperlipidemia, and Hypertension.     Social History: reports that he has been smoking. He has been smoking about 1.00 pack per day. He has never used smokeless tobacco. He reports current alcohol use. He reports that he does not use drugs. Family History:   Family History   Problem Relation Age of Onset    High Blood Pressure Mother     Coronary Art Dis Father     Heart Attack Father     High Blood Pressure Father     Diabetes Father        Vitals:  BP (!) 124/53   Pulse 71   Temp 98.9 °F (37.2 °C) (Oral)   Resp 16   Ht 5' 6\" (1.676 m)   Wt 202 lb 4.8 oz (91.8 kg)   SpO2 95%   BMI 32.65 kg/m²   Temp (24hrs), Av.4 °F (36.9 °C), Min:97.7 °F (36.5 °C), Max:99 °F (37.2 °C)    No results for input(s): POCGLU in the last 72 hours. I/O (24Hr): Intake/Output Summary (Last 24 hours) at 3/23/2021 1128  Last data filed at 3/23/2021 0610  Gross per 24 hour   Intake --   Output 6025 ml   Net -6025 ml       Labs:  Hematology:  Recent Labs     21  1445 21  0602   WBC 9.8 12.3*   RBC 3.80* 3.59*   HGB 12.3* 11.4*   HCT 35.8* 33.5*   MCV 94.2 93.3   MCH 32.4 31.8   MCHC 34.4 34.0   RDW 12.3 12.2    192   MPV 9.3 9.5   INR 0.9 1.1     Chemistry:  Recent Labs     21  1445 21  0602   * 140   K 4.2 4.2    108*   CO2 21 23   GLUCOSE 85 105*   BUN 16 15   CREATININE 0.92 0.91   ANIONGAP 8* 9   LABGLOM >60 >60   GFRAA >60 >60   CALCIUM 9.4 9.0   No results for input(s): PROT, LABALBU, LABA1C, S9APKIN, L0WPYIH, FT4, TSH, AST, ALT, LDH, GGT, ALKPHOS, LABGGT, BILITOT, BILIDIR, AMMONIA, AMYLASE, LIPASE, LACTATE, CHOL, HDL, LDLCHOLESTEROL, CHOLHDLRATIO, TRIG, VLDL, TKB86TK, PHENYTOIN, PHENYF, URICACID, POCGLU in the last 72 hours.   ABG:No results found for: POCPH, PHART, PH, POCPCO2, SEL3ZUF, PCO2, POCPO2, PO2ART, PO2, POCHCO3, ZLM4USI, HCO3, NBEA, PBEA, BEART, BE, THGBART, THB, LKG8LWY, CMVI4UVD, P4AIVJEO, O2SAT, FIO2  Lab Results   Component Value Date/Time    [facility] 2021 07:16 PM     Lab Results   Component Value Date/Time    CULTURE NO GROWTH 10 HOURS 03/22/2021 07:16 PM       Radiology:  Ct Abdomen Pelvis Wo Contrast Additional Contrast? None    Result Date: 3/22/2021  1. Solitary right kidney demonstrating moderate hydronephrosis and mild hydroureter without obstructing ureteric lesion. 2. A swirling moderately hyperdense material in the urinary bladder about 8 x 8 cm in the dependent portion. Hematoma versus mass with hemorrhage. 3. Mild prostatomegaly. Physical Examination:        General appearance:  alert, cooperative and no distress  Mental Status:  oriented to person, place and time and normal affect  Lungs:  clear to auscultation bilaterally, normal effort  Heart:  regular rate and rhythm, no murmur  Abdomen:  soft, nontender, nondistended, normal bowel sounds, no masses, hepatomegaly, splenomegaly. Genitourinary:     Comments: Three-way Stone catheter is in place. No external bleeding noted around the urethra. Stone bag still positive for bloody urine and saline with small clots. Extremities:  no edema, redness, tenderness in the calves  Skin:  no gross lesions, rashes, induration    Assessment:        Hospital Problems           Last Modified POA    * (Principal) Acute cystitis with hematuria 3/22/2021 Yes    History of kidney cancer 3/22/2021 Yes    Chronic kidney disease 3/22/2021 Yes    CAD (coronary artery disease) 3/22/2021 Yes    Depression 3/22/2021 Yes    Hyperlipidemia 3/22/2021 Yes    Hypertension 3/22/2021 Yes    H/O ulcerative colitis 3/22/2021 Yes    PTSD (post-traumatic stress disorder) 3/22/2021 Yes    S/P lobectomy of lung 3/22/2021 Yes    Hematoma versus mass of urinary bladder determined by ultrasound 3/22/2021 Yes          Plan:        1. Acute cystitis with hematuria and urinary retention  1. IV hydration with IV Rocephin every 24 hours  2. Maintain three-way Stone catheter and irrigation as directed by urology  3. Await urology direction on hospital course. 2. CKD  1.  Closely monitor renal function -stable since admission  3. Depression and PTSD  1. Paxil  4. Hypertension   1.  Lisinopril, metoprolol -stable    DIXON Barry - LINH  3/23/2021  11:28 AM

## 2021-03-23 NOTE — OP NOTE
Operative Note      Patient: Zoe Rizo  YOB: 1950  MRN: [id number]    Date of Procedure: 3/23/2021    Pre-Op Diagnosis: Gross hematuria, clot retention    Post-Op Diagnosis: Same and Area of inflammation near the bladder neck at the 2 o'clock position       Procedure(s):  CYSTOSCOPY   Bladder irrigation, clot evacuation, insertion of three-way Stone    Surgeon(s):  Adela Roach MD    Assistant:   * No surgical staff found *    Anesthesia: Local    Estimated Blood Loss (mL): less than 520     Complications: None    Specimens:   * No specimens in log *    Implants:  * No implants in log *      Drains:   [REMOVED] Urethral Catheter Double-lumen 22 fr (Removed)   Catheter Indications Urology/Urologist seeing this patient or inserted indwelling catheter;Bladder injury or continuousbladder irrigation 03/23/21 0800   Site Assessment No urethral drainage 03/23/21 0800   Urine Color Pink 03/23/21 0800   Urine Appearance Red flecks 03/23/21 0800       [REMOVED] Urethral Catheter Triple-lumen (Removed)   $ Urethral catheter insertion $ Not inserted for procedure 03/23/21 1150   Catheter Indications Urology/Urologist seeing this patient or inserted indwelling catheter;Bladder injury or continuousbladder irrigation 03/23/21 1150   Site Assessment No urethral drainage 03/23/21 1150   Urine Color Bloody 03/23/21 1150   Urine Appearance Clots 03/23/21 60       80-year-old male, with solitary right kidney, patient has had a prior nephrectomy for renal cancer apparently more than 40 years ago at [facility]    He presents for evaluation with gross hematuria    The patient had a prior episode of bleeding in January, he was seen by his urologist at the [facility] in [address] [place] [address] for a planned cystoscopy which apparently was canceled because the bleeding stopped the patient had sudden onset of bleeding again and [facility] had clotting in the catheter consequently we scheduled him for clot evacuation and cystoscopy    Detailed Description of Procedure:   Patient was brought to the operating room, positioned in supine, proper patient identification procedure identification prepping and draping in the usual sterile manner. We entered the bladder with the cystoscope, significant amount of blood clots noted on the bladder floor and clots attached to the bladder neck between the 1:00 and 3 o'clock position. Bladder irrigation and clot evacuation carried out. Blood clots sent to pathology for permanent section    Urine sent for UroVysion FISH test.    Careful examination of the bladder revealed some chronic cystitis changes and hemorrhagic cystitis    The patient has been on daily aspirin    The area near the bladder neck at the 1 o'clock position showed significant bullous edema, there was also area of hemorrhagic and cystitis and ulceration on the bladder mucosa near the 3 o'clock position. The dome of the bladder, the anterior and lateral bladder walls were carefully evaluated as well as the prostate and the posterior bladder wall. At the completion bladder was emptied, the scope removed    Three-way Stone catheter inserted with bladder irrigation and patient returned to his room in stable condition.     Recommendations we will await the pathology report on the bladder clots removed as well as the urine for UroVysion FISH test    Patient advised to stop the aspirin continue with the bladder irrigation over the next 24 hours    Electronically signed by Kenia Whatley MD on 3/23/2021 at 6:02 PM

## 2021-03-23 NOTE — PROGRESS NOTES
Physical Therapy    Facility/Department: [facility]  Initial Assessment    NAME: Lola Holly  : 1950  MRN: [id number]    Date of Service: 3/23/2021    Discharge Recommendations:  Home with Assist      Pt presented to ED on 3/22/21 for evaluation of abdominal pain. Pt has some pressure to the lower abdomen in addition to some blood. He states he has been having some bright red blood in the urine in addition to some large clots. He had a bladder scan done that revealed 840 mL in his bladder after he had just urinated. He does have a history of a previous TURP. He also has a he previous history of nephrectomy for kidney cancer. He states that he knows he has a mass on the right kidney is due to have another CT scan done in May. Follows with urology at the [facility]. RN reports patient is medically stable for therapy treatment this date. Chart reviewed prior to treatment and patient is agreeable for therapy. Assessment   Body structures, Functions, Activity limitations: Decreased functional mobility ; Decreased endurance;Decreased balance;Decreased safe awareness;Decreased ADL status  Assessment: Pt with deficits noted in bed mobility, transfers, ambulation, balance, and endurance this session. Pt requires continued IP PT to maximize independence with functional mobility, balance, safety awareness & activity tolerance & should be appropriate to D/C Home with assist at D/C  Prognosis: Good  Decision Making: Medium Complexity  Exam: ROM, MMT, functional mobility, activity tolerance, Balance, & [employer] AM-PAC 6 Clicks Basic Mobility  Clinical Presentation: evolving  PT Education: Goals;PT Role;Plan of Care;Transfer Training;General Safety; Functional Mobility Training;Gait Training  Patient Education: Ed pt on functional mobility, safety awareness, importance of being up & OOB to regain strength, & prevention of sedentary complications  REQUIRES PT FOLLOW UP: Yes  Activity Tolerance  Activity Tolerance: Patient limited by endurance       Patient Diagnosis(es): The primary encounter diagnosis was Hematuria, unspecified type. A diagnosis of Urinary tract infection with hematuria, site unspecified was also pertinent to this visit. has a past medical history of Arthritis, CAD (coronary artery disease), Cancer ([facility] [address] Chronic kidney disease, Depression, History of kidney cancer, Hyperlipidemia, and Hypertension. has a past surgical history that includes Kidney removal (Left); Cholecystectomy; Lung removal, partial (Right); eye surgery; Cardiac surgery; Coronary angioplasty with stent; skin biopsy; and knee surgery.     Restrictions  Restrictions/Precautions  Restrictions/Precautions: General Precautions, Fall Risk  Position Activity Restriction  Other position/activity restrictions: up with assist, NPO, has continuous bladder irrigation, morales & RUE IV  Vision/Hearing  Vision: Impaired  Vision Exceptions: Wears glasses for reading  Hearing: Exceptions to [facility]  Hearing Exceptions: Hard of hearing/hearing concerns     Subjective  General  Chart Reviewed: Yes  Patient assessed for rehabilitation services?: Yes  Additional Pertinent Hx: OA, CAD, CKD, Left nephrectomy 2* renal cancer, partial lung removal(ML) on right  Response To Previous Treatment: Not applicable  General Comment  Comments: RN okays PT  Subjective  Subjective: Pt agreeable to PT  Pain Screening  Patient Currently in Pain: Denies  Vital Signs  Patient Currently in Pain: Denies       Orientation  Orientation  Overall Orientation Status: Within Normal Limits  Social/Functional History  Social/Functional History  Lives With: Spouse  Type of Home: House  Home Layout: One level  Home Access: Stairs to enter with rails  Entrance Stairs - Number of Steps: 2  Entrance Stairs - Rails: Right  Bathroom Shower/Tub: Tub/Shower unit  Bathroom Toilet: Standard  ADL Assistance: Independent  Homemaking Assistance: Needs assistance(spouse completes)  Homemaking Responsibilities: Yes  Ambulation Assistance: Independent  Transfer Assistance: Independent  Active : Yes  Mode of Transportation: Car  Occupation: Retired  Type of occupation: heavy duty   Leisure & Hobbies: restore old cars, currently remodeling his house  Additional Comments: Pt denies falls  Cognition   Cognition  Overall Cognitive Status: WFL    Objective     Observation/Palpation  Posture: Fair  Observation: resting in bed, cyurrently has continuous bladder irrigation through IV    AROM RLE (degrees)  RLE AROM: WFL  AROM LLE (degrees)  LLE AROM : WFL  AROM RUE (degrees)  RUE General AROM: see OT assessment  AROM LUE (degrees)  LUE General AROM: see OT assessment  Strength RLE  Strength RLE: WFL  Strength LLE  Strength LLE: WFL  Strength RUE  Comment: see OT assessment  Strength LUE  Comment: see OT assessment  Tone RLE  RLE Tone: Normotonic  Tone LLE  LLE Tone: Normotonic  Coordination  Movements Are Fluid And Coordinated: Yes  Sensation  Overall Sensation Status: WFL  Bed mobility  Rolling to Right: Supervision  Supine to Sit: Contact guard assistance  Scooting: Contact guard assistance  Comment: Ed on technique & for safety with lines  Transfers  Sit to Stand: Contact guard assistance  Stand to sit: Contact guard assistance  Bed to Chair: Contact guard assistance  Stand Pivot Transfers: Contact guard assistance  Lateral Transfers: Contact guard assistance  Comment: Ed on how to use UB support to sit/stand  Ambulation  Ambulation?: Yes  Ambulation 1  Surface: level tile  Device: (IV pole for support)  Assistance: Contact guard assistance  Quality of Gait: moves slow & guarded, step to pattern  Gait Deviations: Slow Camilla  Distance: 100ft   Pt sat to EOB after gait, rested 2 minutes then ambulated to chair     Balance  Sitting - Static: Good  Sitting - Dynamic: Good  Standing - Static: Good;-  Standing - Dynamic: Fair;+  Exercises  Comments: Ed pt on functional mobility, safety awareness, importance of being up & OOB to regain strength, & prevention of sedentary complications   Pt up in chair at end of visit. All lines intact, call light within reach, and patient positioned comfortably at end of treatment. All patient needs addressed prior to ending therapy session. Plan   Plan  Times per week: 1-2x/D, 5-6D/week  Current Treatment Recommendations: Strengthening, Balance Training, Functional Mobility Training, Transfer Training, Gait Training, Endurance Training, Home Exercise Program, Patient/Caregiver Education & Training, Safety Education & Training  Safety Devices  Type of devices: Bed alarm in place, Call light within reach, Gait belt, Patient at risk for falls, Left in chair, Nurse notified    G-Code       OutComes Score                                                  AM-PAC Score  AM-PAC Inpatient Mobility Raw Score : 19 (03/23/21 0955)  AM-PAC Inpatient T-Scale Score : 45.44 (03/23/21 0955)  Mobility Inpatient CMS 0-100% Score: 41.77 (03/23/21 0955)  Mobility Inpatient CMS G-Code Modifier : CK (03/23/21 0955)          Goals  Short term goals  Time Frame for Short term goals: 12 visits  Short term goal 1: Inc bed-mobility & transfers to independent to enable pt to safely get in/OOB  Short term goal 2: Inc gait to amb 500 ft or > indep to enable pt to return to previous level of independence  Short term goal 3: Pt able to tolerate 30-40 min of activity to include 15-20 reps of ex, NMR & functional mobility including 10 minutes of standing to facilitate activity tolerance to [facility]  Short term goal 4:  Inc standing balance to good with device to facilitate pt independence for performance of ADL's & functional mobility, & reduce fall risk  Short term goal 5: Ed pt on home ex's, safety & energy principles, & issue written home program       Therapy Time   Individual Concurrent Group Co-treatment   Time In 0924         Time Out 0955         Minutes 31+10=41 Additional 10 minutes for chart review          [address], PT

## 2021-03-23 NOTE — PLAN OF CARE
Problem: Pain:  Goal: Pain level will decrease  Description: Pain level will decrease  Outcome: Ongoing  Note: Patient denies any pain at this time, will continue to monitor.       Problem: Bleeding:  Goal: Will show no signs and symptoms of excessive bleeding  Description: Will show no signs and symptoms of excessive bleeding  Outcome: Ongoing  Note: Bladder irrigation continued as ordered, will continue to monitor color and adjust irrigation as needed

## 2021-03-23 NOTE — PROGRESS NOTES
Transitions of Care Pharmacy Service   Medication Review    The patient's list of current home medications has been reviewed. Source(s) of information: Patient's wife/[facility] medication list    Based on information provided by the above source(s), I have updated the patient's home med list as described below. Please review the ACTION REQUESTED section of this note below for any discrepancies on current hospital orders. I changed or updated the following medications on the patient's home medication list:  Removed None     Added Trazodone 100mg- 100mg QHS     Adjusted   Doxazosin strength added, doxazosin 2mg- 2mg QHS  Vitamin C strength added, vitamin C 500mg- 500mg QAM  Sulfasalazine strength added, sulfasalazine 500mg- 1,000mg BID  Vitamin D strength added, vitamin D 1,000 units- 1,000 units QD  Fish oil 1,000mg- 1,000mg QD changed to fish oil 1,000mg- 1,000 mg BID     Other Notes None         PROVIDER ACTION REQUESTED  Medications that need to be addressed by a physician/nurse practitioner:    Medication Action Requested     All medications     Review and reconcile         Please feel free to call me with any questions about this encounter. Thank you.     Yanni Jara [facility]   Transitions of Care Pharmacy Service  Phone:  [phone]  Fax: [phone]      Electronically signed by Yanni Jara [facility] on 3/23/2021 at 2:37 PM       Medications Prior to Admission: ascorbic acid (VITAMIN C) 500 MG tablet, Take 500 mg by mouth every morning  vitamin D (CHOLECALCIFEROL) 25 MCG (1000 UT) TABS tablet, Take 1,000 Units by mouth daily  traZODone (DESYREL) 100 MG tablet, Take 100 mg by mouth nightly  aspirin 81 MG EC tablet, Take 81 mg by mouth daily  Omega-3 Fatty Acids (FISH OIL) 1000 MG CPDR, Take 1,000 mg by mouth 2 times daily   PARoxetine (PAXIL) 40 MG tablet, Take 40 mg by mouth every morning  metoprolol tartrate (LOPRESSOR) 50 MG tablet, Take 50 mg by mouth 2 times daily  lisinopril (PRINIVIL;ZESTRIL) 20 MG tablet, Take 20 mg by mouth daily  sulfaSALAzine (SULFAZINE) 500 MG tablet, Take 1,000 mg by mouth 2 times daily   colestipol (COLESTID) 1 g tablet, Take 5 g by mouth 2 times daily Take 5 tablets twice per day  doxazosin (CARDURA) 2 MG tablet, Take 2 mg by mouth nightly   Multiple Vitamins-Minerals (MULTIVITAL PO), Take by mouth daily

## 2021-03-23 NOTE — ED PROVIDER NOTES
[facility]  eMERGENCY dEPARTMENT eNCOUnter      Pt Name: Zoe Rizo  MRN: [id number]  [place] 1950  Date of evaluation: 3/22/2021  Provider: [address] [place] [address] NP, DIXON Hastings 9581       Chief Complaint   Patient presents with    Abdominal Pain     onset 1000    Hematuria         HISTORY OF PRESENT ILLNESS  (Location/Symptom, Timing/Onset, Context/Setting, Quality, Duration, Modifying Factors, Severity.)   Zoe Rizo is a 79 y.o. male who presents to the emergency department by private vehicle for evaluation of abdominal pain. Patient has some pressure to the lower abdomen in addition to some blood. He states he has been having some bright red blood in the urine in addition to some large clots. He had a bladder scan done that revealed 840 mL in his bladder after he had just urinated. He does have a history of a previous TURP. He also has a he previous history of nephrectomy for kidney cancer. He states that he knows he has a mass on the right kidney is due to have another CT scan done in May. Follows with urology at the [place]. Nursing Notes were reviewed. ALLERGIES     Patient has no known allergies.     CURRENT MEDICATIONS       Current Discharge Medication List      CONTINUE these medications which have NOT CHANGED    Details   aspirin 81 MG EC tablet Take 81 mg by mouth daily      Omega-3 Fatty Acids (FISH OIL) 1000 MG CPDR Take 1,000 mg by mouth daily      PARoxetine (PAXIL) 40 MG tablet Take 40 mg by mouth every morning      metoprolol tartrate (LOPRESSOR) 50 MG tablet Take 50 mg by mouth 2 times daily      lisinopril (PRINIVIL;ZESTRIL) 20 MG tablet Take 20 mg by mouth daily      sulfaSALAzine (SULFAZINE PO) Take 2 tablets by mouth every morning      colestipol (COLESTID) 5 g packet Take 5 g by mouth 2 times daily      Ascorbic Acid (VITAMIN C PO) Take by mouth      VITAMIN D PO Take by mouth      DOXAZOSIN MESYLATE PO Take by mouth nightly      Multiple Vitamins-Minerals (MULTIVITAL PO) Take by mouth             PAST MEDICAL HISTORY         Diagnosis Date    Arthritis     CAD (coronary artery disease)     Cancer ([facility] [address]     Chronic kidney disease     Depression     History of kidney cancer     Hyperlipidemia     Hypertension        SURGICAL HISTORY           Procedure Laterality Date    CARDIAC SURGERY      CHOLECYSTECTOMY      CORONARY ANGIOPLASTY WITH STENT PLACEMENT      EYE SURGERY      KIDNEY REMOVAL Left     KNEE SURGERY      LUNG REMOVAL, PARTIAL Right     middle lob    SKIN BIOPSY           FAMILY HISTORY           Problem Relation Age of Onset    High Blood Pressure Mother     Coronary Art Dis Father     Heart Attack Father     High Blood Pressure Father     Diabetes Father      Family Status   Relation Name Status    Mother  (Not Specified)    Father  (Not Specified)        SOCIAL HISTORY      reports that he has been smoking. He has been smoking about 1.00 pack per day. He has never used smokeless tobacco. He reports current alcohol use. He reports that he does not use drugs. REVIEW OF SYSTEMS    (2-9 systems for level 4, 10 or more for level 5)     Review of Systems   Constitutional: Negative for chills, fever and unexpected weight change. HENT: Negative for congestion, rhinorrhea, sinus pressure and sore throat. Respiratory: Negative for cough, shortness of breath and wheezing. Cardiovascular: Negative for chest pain and palpitations. Gastrointestinal: Negative for abdominal pain, constipation, diarrhea, nausea and vomiting. Genitourinary: Positive for hematuria. Negative for dysuria. Musculoskeletal: Negative for arthralgias and myalgias. Skin: Negative for color change and rash. Neurological: Negative for dizziness, weakness and headaches. Hematological: Negative for adenopathy. All other systems reviewed and are negative.        Except as noted above the remainder of the review of systems was reviewed and negative. PHYSICAL EXAM    (up to 7 for level 4, 8 or more for level 5)     ED Triage Vitals   BP Temp Temp Source Pulse Resp SpO2 Height Weight   03/22/21 1326 03/22/21 1326 03/22/21 1326 03/22/21 1326 03/22/21 1326 03/22/21 1326 03/22/21 1324 03/22/21 1324   (!) 152/72 97.7 °F (36.5 °C) Oral 63 18 98 % 5' 6\" (1.676 m) 202 lb (91.6 kg)       Physical Exam  Vitals signs reviewed. Constitutional:       Appearance: He is well-developed. HENT:      Head: Normocephalic and atraumatic. Eyes:      Conjunctiva/sclera: Conjunctivae normal.      Pupils: Pupils are equal, round, and reactive to light. Neck:      Musculoskeletal: Normal range of motion and neck supple. Cardiovascular:      Rate and Rhythm: Normal rate and regular rhythm. Pulmonary:      Effort: Pulmonary effort is normal. No respiratory distress. Breath sounds: Normal breath sounds. No stridor. Abdominal:      General: Bowel sounds are normal.      Palpations: Abdomen is soft. Musculoskeletal: Normal range of motion. Lymphadenopathy:      Cervical: No cervical adenopathy. Skin:     General: Skin is warm and dry. Findings: No rash. Neurological:      Mental Status: He is alert and oriented to person, place, and time. RADIOLOGY:   Non-plain film images such as CT, Ultrasound and MRI are read by the radiologist. [facility] radiographic images are visualized and preliminarily interpreted by the emergency physician with the below findings:    Ct Abdomen Pelvis Wo Contrast Additional Contrast? None    Result Date: 3/22/2021  EXAMINATION: CT OF THE ABDOMEN AND PELVIS WITHOUT CONTRAST 3/22/2021 2:15 pm TECHNIQUE: CT of the abdomen and pelvis was performed without the administration of intravenous contrast. Multiplanar reformatted images are provided for review. Dose modulation, iterative reconstruction, and/or weight based adjustment of the mA/kV was utilized to reduce the radiation dose to as low as reasonably achievable. COMPARISON: None. HISTORY: ORDERING SYSTEM PROVIDED HISTORY: hematuria. TECHNOLOGIST PROVIDED HISTORY: hematuria. Decision Support Exception->Emergency Medical Condition (MA) Reason for Exam: Gross hematuria, dysuria, starting this morning. Hx previous renal cancer, and left nephrectomy, cholecystectomy. Acuity: Acute Type of Exam: Initial FINDINGS: Lower Chest: The visualized heart and lungs show no acute abnormalities. Organs: Cholecystectomy. Liver, spleen, pancreas, adrenal glands show no significant abnormalities. GI/Bowel: There is limited evaluation due to absence of oral contrast. The stomach shows no focal lesions. Small bowel loops normal in caliber showing no focal abnormalities. Normal appendix. Evaluation of the colon shows no acute process. Pelvis: Large amount of swirling moderately hyperdense material in the urinary bladder about 8 x 8 mm. This may represent a hematoma or mass with hemorrhage. Urinary bladder shows no wall thickening. There is mild prostatomegaly. No pelvic lymphadenopathy. Peritoneum/Retroperitoneum: Solitary right kidney. Mild perinephric stranding. There is right hydronephrosis and mild hydroureter. No obstructing calculus. Bones/Soft Tissues: No acute abnormality of the bones. The superficial soft tissues show no significant abnormalities. 1. Solitary right kidney demonstrating moderate hydronephrosis and mild hydroureter without obstructing ureteric lesion. 2. A swirling moderately hyperdense material in the urinary bladder about 8 x 8 cm in the dependent portion. Hematoma versus mass with hemorrhage. 3. Mild prostatomegaly. Interpretation per the Radiologist below, if available at the time of this note:    CT ABDOMEN PELVIS WO CONTRAST Additional Contrast? None   Final Result   1. Solitary right kidney demonstrating moderate hydronephrosis and mild   hydroureter without obstructing ureteric lesion.    2. A swirling moderately hyperdense material in the urinary bladder about 8 x   8 cm in the dependent portion. Hematoma versus mass with hemorrhage. 3. Mild prostatomegaly. LABS:  Labs Reviewed   URINALYSIS WITH MICROSCOPIC - Abnormal; Notable for the following components:       Result Value    Color, UA RED (*)     Turbidity UA TURBID (*)     Glucose, Ur 1+ (*)     Ketones, Urine 2+ (*)     Urine Hgb 3+ (*)     Protein, UA 3+ (*)     Urobilinogen, Urine ELEVATED (*)     Nitrite, Urine POSITIVE (*)     Leukocyte Esterase, Urine LARGE (*)     All other components within normal limits   CBC WITH AUTO DIFFERENTIAL - Abnormal; Notable for the following components:    RBC 3.80 (*)     Hemoglobin 12.3 (*)     Hematocrit 35.8 (*)     Seg Neutrophils 69 (*)     Lymphocytes 19 (*)     All other components within normal limits   BASIC METABOLIC PANEL - Abnormal; Notable for the following components:    Sodium 129 (*)     Anion Gap 8 (*)     All other components within normal limits   CULTURE, URINE   CULTURE, BLOOD 1   CULTURE, BLOOD 2   PROTIME-INR   URINE RT REFLEX TO CULTURE   PROCALCITONIN   BASIC METABOLIC PANEL W/ REFLEX TO MG FOR LOW K   CBC   PROTIME-INR       All other labs were within normal range or not returned as of this dictation. EMERGENCY DEPARTMENT COURSE and DIFFERENTIAL DIAGNOSIS/MDM:   Vitals:    Vitals:    03/22/21 1324 03/22/21 1326 03/22/21 1826 03/22/21 1923   BP:  (!) 152/72 (!) 149/63 (!) 141/63   Pulse:  63 73 74   Resp:  18 18 17   Temp:  97.7 °F (36.5 °C) 99 °F (37.2 °C) 97.9 °F (36.6 °C)   TempSrc:  Oral Oral Oral   SpO2:  98% 96% 95%   Weight: 202 lb (91.6 kg)      Height: 5' 6\" (1.676 m)          Medical Decision Making: Patient's presentation, laboratory studies and imaging were discussed with Dr. Ivette Bates. He would like to the patient n.p.o. after midnight. Continue with the three-way bladder irrigation. The patient was given a dose of Rocephin for UTI and will be admitted to internal medicine.     CONSULTS:  IP CONSULT TO UROLOGY  IP CONSULT TO INTERNAL MEDICINE        FINAL IMPRESSION      1. Hematuria, unspecified type    2. Urinary tract infection with hematuria, site unspecified          DISPOSITION/PLAN   DISPOSITION Admitted 03/22/2021 04:54:33 PM      PATIENT REFERRED TO:   No follow-up provider specified.     DISCHARGE MEDICATIONS:     Current Discharge Medication List              (Please note that portions of this note were completed with a voice recognition program.  Efforts were made to edit the dictations but occasionally words are mis-transcribed.)    [facility] NP, APRN - [place]  Certified Nurse Practitioner          Miriam Goodman, DIXON - MONIKA  03/22/21 8227

## 2021-03-23 NOTE — ACP (ADVANCE CARE PLANNING)
Advance Care Planning     Advance Care Planning Clinical Specialist  Conversation Note      Date of ACP Conversation: 03/23/2021    Conversation Conducted with: Patient with Cheryl 51: Named in Advance Directive or Healthcare Power of Merlinangelqian (name) Amanda Roth (spouse)   [phone]    ACP Clinical Specialist: Florentin Goldman [id number] Decision Maker:     Current Designated Healthcare Decision Maker:     Wife Leonid Moss and patient states taht they have a living will and DPOA at home. Leonid Moss will [facility] to [facility] before [facility]      Care Preferences    Hospitalization: \"If your health worsens and it becomes clear that your chance of recovery is unlikely, what would your preference be regarding hospitalization? \"    Choice:  [x] The patient wants hospitalization. [] The patient prefers comfort-focused treatment without hospitalization. Ventilation: \"If you were in your present state of health and suddenly became very ill and were unable to breathe on your own, what would your preference be about the use of a ventilator (breathing machine) if it were available to you? \"      If the patient would desire the use of ventilator (breathing machine), answer \"yes\". If not, \"no\": yes    \"If your health worsens and it becomes clear that your chance of recovery is unlikely, what would your preference be about the use of a ventilator (breathing machine) if it were available to you? \"     Would the patient desire the use of ventilator (breathing machine)?: No      Resuscitation  \"CPR works best to restart the heart when there is a sudden event, like a heart attack, in someone who is otherwise healthy. Unfortunately, CPR does not typically restart the heart for people who have serious health conditions or who are very sick. \"    \"In the event your heart stopped as a result of an underlying serious health condition, would you want attempts to be made to restart your heart (answer \"yes\" for attempt to resuscitate) or would you prefer a natural death (answer \"no\" for do not attempt to resuscitate)? \" yes       [] Yes   [x] No   Educated Patient / [employer] Services regarding differences between Advance Directives and portable DNR orders.     Length of ACP Conversation in minutes:  5    Conversation Outcomes:  [x] ACP discussion completed  [] Existing advance directive reviewed with patient; no changes to patient's previously recorded wishes  [] New Advance Directive completed  [] Portable Do Not Rescitate prepared for Provider review and signature  [] POLST/POST/MOLST/MOST prepared for Provider review and signature      Follow-up plan:    [] Schedule follow-up conversation to continue planning  [] Referred individual to Provider for additional questions/concerns   [] Advised patient/agent/surrogate to review completed ACP document and update if needed with changes in condition, patient preferences or care setting    [] This note routed to one or more involved healthcare providers

## 2021-03-23 NOTE — PROGRESS NOTES
Patient taken by cart to Pre-op area. Report given to receiving RN at bedside. CBI continued at time of transfer. Patient's family also present during transfer. Patient stable at time of transfer.

## 2021-03-24 LAB
ABSOLUTE EOS #: 0.11 K/UL (ref 0–0.44)
ABSOLUTE IMMATURE GRANULOCYTE: 0.06 K/UL (ref 0–0.3)
ABSOLUTE LYMPH #: 1.72 K/UL (ref 1.1–3.7)
ABSOLUTE MONO #: 0.79 K/UL (ref 0.1–1.2)
ANION GAP SERPL CALCULATED.3IONS-SCNC: 12 MMOL/L (ref 9–17)
BASOPHILS # BLD: 1 % (ref 0–2)
BASOPHILS ABSOLUTE: 0.05 K/UL (ref 0–0.2)
BUN BLDV-MCNC: 12 MG/DL (ref 8–23)
BUN/CREAT BLD: 16 (ref 9–20)
CALCIUM SERPL-MCNC: 8.7 MG/DL (ref 8.6–10.4)
CHLORIDE BLD-SCNC: 105 MMOL/L (ref 98–107)
CO2: 22 MMOL/L (ref 20–31)
CREAT SERPL-MCNC: 0.77 MG/DL (ref 0.7–1.2)
DIFFERENTIAL TYPE: ABNORMAL
EOSINOPHILS RELATIVE PERCENT: 1 % (ref 1–4)
GFR AFRICAN AMERICAN: >60 ML/MIN
GFR NON-AFRICAN AMERICAN: >60 ML/MIN
GFR SERPL CREATININE-BSD FRML MDRD: ABNORMAL ML/MIN/{1.73_M2}
GFR SERPL CREATININE-BSD FRML MDRD: ABNORMAL ML/MIN/{1.73_M2}
GLUCOSE BLD-MCNC: 111 MG/DL (ref 70–99)
HCT VFR BLD CALC: 32.6 % (ref 40.7–50.3)
HEMOGLOBIN: 11 G/DL (ref 13–17)
IMMATURE GRANULOCYTES: 1 %
LYMPHOCYTES # BLD: 17 % (ref 24–43)
MCH RBC QN AUTO: 31.8 PG (ref 25.2–33.5)
MCHC RBC AUTO-ENTMCNC: 33.7 G/DL (ref 28.4–34.8)
MCV RBC AUTO: 94.2 FL (ref 82.6–102.9)
MONOCYTES # BLD: 8 % (ref 3–12)
NRBC AUTOMATED: 0 PER 100 WBC
PDW BLD-RTO: 12.4 % (ref 11.8–14.4)
PLATELET # BLD: 179 K/UL (ref 138–453)
PLATELET ESTIMATE: ABNORMAL
PMV BLD AUTO: 8.6 FL (ref 8.1–13.5)
POTASSIUM SERPL-SCNC: 4 MMOL/L (ref 3.7–5.3)
RBC # BLD: 3.46 M/UL (ref 4.21–5.77)
RBC # BLD: ABNORMAL 10*6/UL
SEG NEUTROPHILS: 72 % (ref 36–65)
SEGMENTED NEUTROPHILS ABSOLUTE COUNT: 7.66 K/UL (ref 1.5–8.1)
SODIUM BLD-SCNC: 139 MMOL/L (ref 135–144)
WBC # BLD: 10.4 K/UL (ref 3.5–11.3)
WBC # BLD: ABNORMAL 10*3/UL

## 2021-03-24 PROCEDURE — 2580000003 HC RX 258: Performed by: UROLOGY

## 2021-03-24 PROCEDURE — 97530 THERAPEUTIC ACTIVITIES: CPT

## 2021-03-24 PROCEDURE — 97116 GAIT TRAINING THERAPY: CPT

## 2021-03-24 PROCEDURE — APPSS45 APP SPLIT SHARED TIME 31-45 MINUTES: Performed by: NURSE PRACTITIONER

## 2021-03-24 PROCEDURE — 80048 BASIC METABOLIC PNL TOTAL CA: CPT

## 2021-03-24 PROCEDURE — 36415 COLL VENOUS BLD VENIPUNCTURE: CPT

## 2021-03-24 PROCEDURE — 97535 SELF CARE MNGMENT TRAINING: CPT

## 2021-03-24 PROCEDURE — 6360000002 HC RX W HCPCS: Performed by: UROLOGY

## 2021-03-24 PROCEDURE — 1200000000 HC SEMI PRIVATE

## 2021-03-24 PROCEDURE — 51700 IRRIGATION OF BLADDER: CPT

## 2021-03-24 PROCEDURE — 85025 COMPLETE CBC W/AUTO DIFF WBC: CPT

## 2021-03-24 PROCEDURE — 99232 SBSQ HOSP IP/OBS MODERATE 35: CPT | Performed by: FAMILY MEDICINE

## 2021-03-24 PROCEDURE — 6370000000 HC RX 637 (ALT 250 FOR IP): Performed by: UROLOGY

## 2021-03-24 PROCEDURE — 97166 OT EVAL MOD COMPLEX 45 MIN: CPT

## 2021-03-24 RX ADMIN — METOPROLOL TARTRATE 50 MG: 50 TABLET, FILM COATED ORAL at 20:49

## 2021-03-24 RX ADMIN — PAROXETINE HYDROCHLORIDE 40 MG: 20 TABLET, FILM COATED ORAL at 09:58

## 2021-03-24 RX ADMIN — SODIUM CHLORIDE: 9 INJECTION, SOLUTION INTRAVENOUS at 09:58

## 2021-03-24 RX ADMIN — LISINOPRIL 20 MG: 20 TABLET ORAL at 09:58

## 2021-03-24 RX ADMIN — METOPROLOL TARTRATE 50 MG: 50 TABLET, FILM COATED ORAL at 09:58

## 2021-03-24 RX ADMIN — CEFTRIAXONE SODIUM 1000 MG: 1 INJECTION, POWDER, FOR SOLUTION INTRAMUSCULAR; INTRAVENOUS at 17:42

## 2021-03-24 RX ADMIN — SODIUM CHLORIDE: 9 INJECTION, SOLUTION INTRAVENOUS at 23:44

## 2021-03-24 RX ADMIN — DOXAZOSIN 1 MG: 1 TABLET ORAL at 20:49

## 2021-03-24 RX ADMIN — SODIUM CHLORIDE, PRESERVATIVE FREE 10 ML: 5 INJECTION INTRAVENOUS at 20:49

## 2021-03-24 RX ADMIN — MORPHINE SULFATE 2 MG: 2 INJECTION, SOLUTION INTRAMUSCULAR; INTRAVENOUS at 16:21

## 2021-03-24 RX ADMIN — SULFASALAZINE 1000 MG: 500 TABLET ORAL at 10:07

## 2021-03-24 RX ADMIN — SULFASALAZINE 1000 MG: 500 TABLET ORAL at 20:49

## 2021-03-24 ASSESSMENT — PAIN DESCRIPTION - FREQUENCY: FREQUENCY: INTERMITTENT

## 2021-03-24 ASSESSMENT — PAIN DESCRIPTION - PROGRESSION: CLINICAL_PROGRESSION: GRADUALLY IMPROVING

## 2021-03-24 ASSESSMENT — PAIN - FUNCTIONAL ASSESSMENT: PAIN_FUNCTIONAL_ASSESSMENT: PREVENTS OR INTERFERES SOME ACTIVE ACTIVITIES AND ADLS

## 2021-03-24 ASSESSMENT — PAIN DESCRIPTION - PAIN TYPE: TYPE: ACUTE PAIN

## 2021-03-24 ASSESSMENT — PAIN DESCRIPTION - ONSET: ONSET: SUDDEN

## 2021-03-24 ASSESSMENT — PAIN DESCRIPTION - LOCATION: LOCATION: ABDOMEN

## 2021-03-24 ASSESSMENT — PAIN SCALES - GENERAL: PAINLEVEL_OUTOF10: 0

## 2021-03-24 ASSESSMENT — PAIN DESCRIPTION - ORIENTATION: ORIENTATION: ANTERIOR

## 2021-03-24 NOTE — PROGRESS NOTES
questionable bladder hematoma versus mass was identified. Stone catheter was placed and patient was placed on three-way irrigation. Urinalysis reveals UTI.     3/23 -Little change in condition. Three-way Stone catheter remains in place with bloody return. Blood and urine cultures negative thus far. We await urology direction on hospital course. 3/24 -condition improved. Bladder irrigation continues per urology. Return is pink with less clots today. No pain or distention reported. Review of Systems:     Constitutional:  negative for chills, fevers, sweats  Respiratory:  negative for cough, dyspnea on exertion, shortness of breath, wheezing  Cardiovascular:  negative for chest pain, chest pressure/discomfort, lower extremity edema, palpitations  Gastrointestinal:  negative for abdominal pain, constipation, diarrhea, nausea, vomiting. Does endorse intermittent suprapubic pain when fully return becomes occluded. Neurological:  negative for dizziness, headache    Medications: Allergies:  No Known Allergies    Current Meds:   Scheduled Meds:    lisinopril  20 mg Oral Daily    metoprolol tartrate  50 mg Oral BID    PARoxetine  40 mg Oral QAM    sulfaSALAzine  1,000 mg Oral BID    doxazosin  1 mg Oral Nightly    sodium chloride flush  10 mL Intravenous 2 times per day    cefTRIAXone (ROCEPHIN) IV  1,000 mg Intravenous Q24H     Continuous Infusions:    sodium chloride 75 mL/hr at 03/23/21 2039     PRN Meds: lidocaine, lidocaine, sodium chloride flush, potassium chloride **OR** potassium alternative oral replacement **OR** potassium chloride, magnesium sulfate, promethazine **OR** ondansetron, polyethylene glycol, nicotine, acetaminophen **OR** acetaminophen, morphine    Data:     Past Medical History:   has a past medical history of Arthritis, CAD (coronary artery disease), Cancer ([facility] [address] Chronic kidney disease, Depression, History of kidney cancer, Hyperlipidemia, and Hypertension.     Social History:   reports that he has been smoking. He has been smoking about 1.00 pack per day. He has never used smokeless tobacco. He reports current alcohol use. He reports that he does not use drugs. Family History:   Family History   Problem Relation Age of Onset    High Blood Pressure Mother     Coronary Art Dis Father     Heart Attack Father     High Blood Pressure Father     Diabetes Father        Vitals:  BP (!) 111/49   Pulse 71   Temp 97.6 °F (36.4 °C) (Oral)   Resp 19   Ht 5' 6\" (1.676 m)   Wt 205 lb 1.6 oz (93 kg)   SpO2 96%   BMI 33.10 kg/m²   Temp (24hrs), Av.6 °F (37 °C), Min:97.6 °F (36.4 °C), Max:99.5 °F (37.5 °C)    No results for input(s): POCGLU in the last 72 hours. I/O (24Hr): Intake/Output Summary (Last 24 hours) at 3/24/2021 0825  Last data filed at 3/24/2021 0746  Gross per 24 hour   Intake 2595 ml   Output 4010 ml   Net -1415 ml       Labs:  Hematology:  Recent Labs     21  1445 21  0602   WBC 9.8 12.3*   RBC 3.80* 3.59*   HGB 12.3* 11.4*   HCT 35.8* 33.5*   MCV 94.2 93.3   MCH 32.4 31.8   MCHC 34.4 34.0   RDW 12.3 12.2    192   MPV 9.3 9.5   INR 0.9 1.1     Chemistry:  Recent Labs     21  1445 21  0602   * 140   K 4.2 4.2    108*   CO2 21 23   GLUCOSE 85 105*   BUN 16 15   CREATININE 0.92 0.91   ANIONGAP 8* 9   LABGLOM >60 >60   GFRAA >60 >60   CALCIUM 9.4 9.0   No results for input(s): PROT, LABALBU, LABA1C, O2KVSJR, C0JYIJW, FT4, TSH, AST, ALT, LDH, GGT, ALKPHOS, LABGGT, BILITOT, BILIDIR, AMMONIA, AMYLASE, LIPASE, LACTATE, CHOL, HDL, LDLCHOLESTEROL, CHOLHDLRATIO, TRIG, VLDL, DGG92CO, PHENYTOIN, PHENYF, URICACID, POCGLU in the last 72 hours.   ABG:No results found for: POCPH, PHART, PH, POCPCO2, MCP8QJZ, PCO2, POCPO2, PO2ART, PO2, POCHCO3, VGO6FHJ, HCO3, NBEA, PBEA, BEART, BE, THGBART, THB, ZPS6RES, ZOMS6LAO, S9GBBZBM, O2SAT, FIO2  Lab Results   Component Value Date/Time    [facility], [place], operated by [facility] 2021 07:16 PM     Lab Results Component Value Date/Time    CULTURE NO GROWTH 1 DAY 03/22/2021 07:16 PM       Radiology:  Ct Abdomen Pelvis Wo Contrast Additional Contrast? None    Result Date: 3/22/2021  1. Solitary right kidney demonstrating moderate hydronephrosis and mild hydroureter without obstructing ureteric lesion. 2. A swirling moderately hyperdense material in the urinary bladder about 8 x 8 cm in the dependent portion. Hematoma versus mass with hemorrhage. 3. Mild prostatomegaly. Physical Examination:        General appearance:  alert, cooperative and no distress  Mental Status:  oriented to person, place and time and normal affect  Lungs:  clear to auscultation bilaterally, normal effort  Heart:  regular rate and rhythm, no murmur  Abdomen:  soft, nontender, nondistended, normal bowel sounds, no masses, hepatomegaly, splenomegaly. Genitourinary:     Comments: Three-way Stone catheter is in place. No external bleeding noted around the urethra. Stone bag still positive for pink return with scarce clots. Extremities:  no edema, redness, tenderness in the calves  Skin:  no gross lesions, rashes, induration    Assessment:        Hospital Problems           Last Modified POA    * (Principal) Acute cystitis with hematuria 3/22/2021 Yes    History of kidney cancer 3/22/2021 Yes    Chronic kidney disease 3/22/2021 Yes    CAD (coronary artery disease) 3/22/2021 Yes    Depression 3/22/2021 Yes    Hyperlipidemia 3/22/2021 Yes    Hypertension 3/22/2021 Yes    H/O ulcerative colitis 3/22/2021 Yes    PTSD (post-traumatic stress disorder) 3/22/2021 Yes    S/P lobectomy of lung 3/22/2021 Yes    Hematoma versus mass of urinary bladder determined by ultrasound 3/22/2021 Yes          Plan:        1. Acute cystitis with hematuria and urinary retention  1. IV hydration with IV Rocephin every 24 hours  2. Maintain three-way Stone catheter and irrigation as directed by urology  3.  Await further urology direction on hospital course. 4. Hemoglobin stable at 11  2. CKD  1. Closely monitor renal function -stable  3. Depression and PTSD  1. Paxil -stable  4. Hypertension   1.  Lisinopril, metoprolol -stable    DIXON Sheridan NP  3/24/2021  8:25 AM

## 2021-03-24 NOTE — PROGRESS NOTES
Occupational Therapy   Occupational Therapy Initial Assessment  Date: 3/24/2021   Patient Name: Jeremiah Turner  MRN: [id number]     : 1950    RN ANTONIA reports patient is medically stable for therapy treatment this date. Chart reviewed prior to treatment and patient is agreeable for therapy. All lines intact and patient positioned comfortably at end of treatment. All patient needs addressed prior to ending therapy session. Date of Service: 3/24/2021    Discharge Recommendations:  Home with Home health OT, Home with assist PRN  OT Equipment Recommendations  Equipment Needed: Yes  Mobility Devices: ADL Assistive Devices  ADL Assistive Devices: Toileting - 3-in-1 Commode;Grab Bars - shower; Reacher;Long-handled [place];Long-handled Sponge;Emergency Alert System    Assessment   Performance deficits / Impairments: Decreased functional mobility ; Decreased safe awareness;Decreased balance;Decreased ADL status; Decreased endurance;Decreased high-level IADLs  Assessment: OT is recommended for this to increase I/safety in function as well as act alexandre and balance to return home with assist as needed. Prognosis: Good  Decision Making: Medium Complexity  OT Education: OT Role;Plan of Care;Family Education; Energy Conservation;Transfer Training  Patient Education: call light use/fall prevention, benefits of being up OOB as able, recommendations for continued therapy, safety in function  REQUIRES OT FOLLOW UP: Yes  Activity Tolerance  Activity Tolerance: Patient Tolerated treatment well;Patient limited by fatigue  Activity Tolerance: fair minus  Safety Devices  Safety Devices in place: Yes  Type of devices: Call light within reach; Left in chair;Patient at risk for falls;Gait belt;Nurse notified(RN, student nurse and spouse with pt upon exit)           Patient Diagnosis(es): The primary encounter diagnosis was Hematuria, unspecified type.  A diagnosis of Urinary tract infection with hematuria, site unspecified was also pertinent to this visit. has a past medical history of Arthritis, CAD (coronary artery disease), Cancer ([address] Chronic kidney disease, Depression, History of kidney cancer, Hyperlipidemia, and Hypertension. has a past surgical history that includes Kidney removal (Left); Cholecystectomy; Lung removal, partial (Right); eye surgery; Cardiac surgery; Coronary angioplasty with stent; skin biopsy; knee surgery; and Cystoscopy (N/A, 3/23/2021). PER H&P: Nancy Madrigal is a 79 y.o. Non-/non  male who presents with Abdominal Pain (onset 1000) and Hematuria   and is admitted to the hospital for the management of Acute cystitis with hematuria.        Restrictions  Restrictions/Precautions  Restrictions/Precautions: General Precautions, Fall Risk  Position Activity Restriction  Other position/activity restrictions: up with assist, ROSANNA, has continuous bladder irrigation, morales & RUE IV    Subjective   General  Chart Reviewed: Yes  Patient assessed for rehabilitation services?: Yes  Family / Caregiver Present: Yes(spouse)  Patient Currently in Pain: no    Social/Functional History  Social/Functional History  Lives With: Spouse  Type of Home: House  Home Layout: One level  Home Access: Stairs to enter with rails  Entrance Stairs - Number of Steps: 2  Entrance Stairs - Rails: Right  Bathroom Shower/Tub: Tub/Shower unit  Bathroom Toilet: Standard  Receives Help From: Family(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        Objective   Vision: Impaired  Vision Transfers: Stand by assistance(pt pushed own IV pole in room)  Sit to stand: Stand by assistance  Stand to sit: Stand by assistance  Transfer Comments: Mod-Min cues needed for hand placement reaching back to surface, controlled stand to sits, and awareness of lines to increase safety/reduce falls. Cognition  Overall Cognitive Status: [facility]  Arousal/Alertness: Appropriate responses to stimuli  Following Commands: Follows all commands without difficulty  Attention Span: Appears intact  Memory: Appears intact  Safety Judgement: Decreased awareness of need for safety  Problem Solving: Assistance required to correct errors made;Assistance required to identify errors made;Decreased awareness of errors  Insights: Decreased awareness of deficits  Initiation: Does not require cues  Sequencing: Does not require cues  Perception  Overall Perceptual Status: WFL     Sensation  Overall Sensation Status: Impaired(pt states his tip of his L thumb is numb due to old injury/cutting with a table saw)        LUE AROM (degrees)  LUE AROM : WFL  RUE AROM (degrees)  RUE AROM : WFL  LUE Strength  Gross LUE Strength: WFL  LUE Strength Comment: BUE strength grossly 4 plus/5  RUE Strength  Gross RUE Strength: WFL                   Plan   Plan  Times per week: 4-5x/week 1x/day as alexandre  Current Treatment Recommendations: Strengthening, Balance Training, Functional Mobility Training, Safety Education & Training, Endurance Training, Patient/Caregiver Education & Training, Self-Care / ADL, Home Management Training, Neuromuscular Re-education, Pain Management                                                      AM-PAC Score   19          Goals  Short term goals  Time Frame for Short term goals: by discharge, pt to demo  Short term goal 1: bed mob tasks with use of rail as needed to MOD I. Short term goal 2: I with ADL transfers and functional mob. Short term goal 3: total body ADL tasks with use of AE/grab bar as needed to MOD I.   Short term goal 4: standing to SUP and alexandre > 8 mins as able to reduce falls. Short term goal 5: I with BUE HEP with use of handouts to maintain strength. Long term goals  Long term goal 1: Pt/family to be I with EC/WS and fall prevention tech as well as DME/AE recommendations with use of handouts. Patient Goals   Patient goals : Get home today!        Therapy Time   Individual Concurrent Group Co-treatment   Time In 0926(plus 10 min chart review/RN communication)         Time Out 0956         Minutes Call[address]

## 2021-03-24 NOTE — PROGRESS NOTES
Physical Therapy  Facility/Department: [facility]  Daily Treatment Note  NAME: Vasiliy Ledesma  : 1950  MRN: [id number]    Date of Service: 3/24/2021    Discharge Recommendations:  Home with assist PRN        Assessment   Body structures, Functions, Activity limitations: Decreased functional mobility ; Decreased endurance;Decreased balance;Decreased safe awareness;Decreased ADL status  Assessment: Patient with deficits in balance, mobility, safety awareness, and endurance. Pt would benefit from continued PT to maximize independence with functional mobility. Would recommend D/C to home with Assist.  Prognosis: Good  Decision Making: Medium Complexity  Exam: ROM, MMT, functional mobility, activity tolerance, Balance, & [employer] AM-PAC 6 Clicks Basic Mobility  Clinical Presentation: evolving  PT Education: Goals;PT Role;Plan of Care;Transfer Training;General Safety; Functional Mobility Training;Gait Training  Patient Education: Patient education handout given on safety and fall prevention. Patient educated on HEP and the importance of continued activity to maximize strength and independence in gait activities. REQUIRES PT FOLLOW UP: Yes  Activity Tolerance  Activity Tolerance: Patient limited by endurance; Patient Tolerated treatment well     Patient Diagnosis(es): The primary encounter diagnosis was Hematuria, unspecified type. A diagnosis of Urinary tract infection with hematuria, site unspecified was also pertinent to this visit. has a past medical history of Arthritis, CAD (coronary artery disease), Cancer ([address] Chronic kidney disease, Depression, History of kidney cancer, Hyperlipidemia, and Hypertension. has a past surgical history that includes Kidney removal (Left);  Cholecystectomy; Lung removal, partial (Right); eye surgery; Cardiac surgery; Coronary angioplasty with stent; skin biopsy; knee surgery; and Cystoscopy (N/A, 3/23/2021). Restrictions  Restrictions/Precautions  Restrictions/Precautions: General Precautions, Fall Risk  Position Activity Restriction  Other position/activity restrictions: up with assist, NPO, has continuous bladder irrigation, morales & RUE IV  Subjective   General  Chart Reviewed: Yes  Additional Pertinent Hx: OA, CAD, CKD, Left nephrectomy 2* renal cancer, partial lung removal(ML) on right  Response To Previous Treatment: Not applicable  Subjective  Subjective: Pt agreeable to PT  General Comment  Comments: RN carole PT          Orientation  Orientation  Overall Orientation Status: Within Normal Limits  Cognition      Objective   Bed mobility  Bridging: Modified independent   Rolling to Left: Modified independent  Rolling to Right: Modified independent  Supine to Sit: Supervision  Sit to Supine: Supervision  Scooting: Supervision  Comment: Patient requires Assist for line management. Patient with good sequencing and positioning. Patient with good understanding of bed mobility techniques. Transfers  Sit to Stand: Stand by assistance  Stand to sit: Stand by assistance  Bed to Chair: Stand by assistance  Stand Pivot Transfers: Contact guard assistance  Lateral Transfers: Contact guard assistance  Comment: Patient with good demo of Sit to Stand technique. Requires Assist for line management and min vc's for progression. Patient with good verbal understanding of safety. Ambulation  Ambulation?: Yes  Ambulation 1  Surface: level tile  Assistance: Stand by assistance  Quality of Gait: moves slow & guarded, step to pattern  Gait Deviations: Slow Camilla  Distance: 200 feet  Comments: Patient uses UE support on IV pole. Stairs/Curb  Stairs?: No  Neuromuscular Education  NDT Treatment: Gait ;Lower extremity; Sitting;Standing  Neuromuscular Comments: Patient performs standing weight shifts 1 set x10 reps and standing marches 1 set x10 reps with no LOB to promote safety in mobility tasks.   Balance  Posture: Good  Sitting - Static: Good  Sitting - Dynamic: Good  Standing - Static: Good;-  Standing - Dynamic: Fair;+  Single Leg Stance R Le  Single Leg Stance L Le  Comments: Patient displays one LOB on Right Single Leg stance with self correction and utilizing ankle to hip strategy for self righting. Exercises  Comments: Patient given HEP for standing exercise with UE support. Patient with good understanding of techniques and the importance of UE support for safety. Patient educated on the importace of OOB activity to prevent secondary complications. Patient educated on the importance of continued PT to maximize strength, endurance, and independence with functional activities. AM-PAC Score  AM-PAC Inpatient Mobility Raw Score : 19 (21)  AM-PAC Inpatient T-Scale Score : 45.44 (21)  Mobility Inpatient CMS 0-100% Score: 41.77 (21)  Mobility Inpatient CMS G-Code Modifier : CK (21)          Goals  Short term goals  Time Frame for Short term goals: 12 visits  Short term goal 1: Inc bed-mobility & transfers to independent to enable pt to safely get in/OOB  Short term goal 2: Inc gait to amb 500 ft or > indep to enable pt to return to previous level of independence  Short term goal 3: Pt able to tolerate 30-40 min of activity to include 15-20 reps of ex, NMR & functional mobility including 10 minutes of standing to facilitate activity tolerance to [facility]  Short term goal 4:  Inc standing balance to good with device to facilitate pt independence for performance of ADL's & functional mobility, & reduce fall risk  Short term goal 5: Ed pt on home ex's, safety & energy principles, & issue written home program    Plan    Plan  Times per week: 1-2x/D, 5-6D/week  Current Treatment Recommendations: Strengthening, Balance Training, Functional Mobility Training, Transfer Training, Gait Training, Endurance Training, Home Exercise Program, Patient/Caregiver Education & Training, Safety Education & Training  Safety Devices  Type of devices: Bed alarm in place, Call light within reach, Gait belt, Patient at risk for falls, Left in chair, Nurse notified     Therapy Time   Individual Concurrent Group Co-treatment   Time In [phone]         Time Out 0915         Minutes [address], [place], [place]

## 2021-03-24 NOTE — PLAN OF CARE
Problem: Safety:  Goal: Free from accidental physical injury  Description: Free from accidental physical injury  3/24/2021 0350 by Amanda Montoya RN  Outcome: Ongoing  3/23/2021 1412 by Augie Morillo RN  Outcome: Ongoing     Problem: Daily Care:  Goal: Daily care needs are met  Description: Daily care needs are met  3/24/2021 0350 by Amanda Montoya RN  Outcome: Ongoing  3/23/2021 1412 by Augie Morillo RN  Outcome: Ongoing     Problem: Pain:  Goal: Patient's pain/discomfort is manageable  Description: Patient's pain/discomfort is manageable  3/24/2021 0350 by Amanda Montoya RN  Outcome: Ongoing  3/23/2021 1412 by Augie Morillo RN  Outcome: Ongoing  Goal: Pain level will decrease  Description: Pain level will decrease  3/24/2021 0350 by Amanda Montoya RN  Outcome: Ongoing  3/23/2021 1412 by Augie Morillo RN  Outcome: Ongoing  Note: Patient denies any pain at this time, will continue to monitor.    Goal: Control of acute pain  Description: Control of acute pain  3/24/2021 0350 by Amanda Montoya RN  Outcome: Ongoing  3/23/2021 1412 by Augie Morillo RN  Outcome: Ongoing  Goal: Control of chronic pain  Description: Control of chronic pain  3/24/2021 0350 by Amanda Montoya RN  Outcome: Ongoing  3/23/2021 1412 by Augie Morillo RN  Outcome: Ongoing     Problem: Discharge Planning:  Goal: Patients continuum of care needs are met  Description: Patients continuum of care needs are met  3/24/2021 0350 by Amanda Montoya RN  Outcome: Ongoing  3/23/2021 1412 by Augie Morillo RN  Outcome: Ongoing     Problem: Bleeding:  Goal: Will show no signs and symptoms of excessive bleeding  Description: Will show no signs and symptoms of excessive bleeding  3/24/2021 0350 by Amanda Montoya RN  Outcome: Ongoing  3/23/2021 1412 by Augie Morillo RN  Outcome: Ongoing  Note: Bladder irrigation continued as ordered, will continue to monitor color and adjust irrigation as needed

## 2021-03-24 NOTE — PLAN OF CARE
Problem: Pain:  Goal: Patient's pain/discomfort is manageable  Description: Patient's pain/discomfort is manageable  3/24/2021 1710 by Lazarus Han RN  Outcome: Ongoing  Note: Patient complains of intermittent pain that is well controlled with relaxation methods and irrigation of morales catheter. Pain medications available to patient when needed also. Problem: Bleeding:  Goal: Will show no signs and symptoms of excessive bleeding  Description: Will show no signs and symptoms of excessive bleeding  3/24/2021 1710 by Lazarus Han RN  Outcome: Ongoing  Note: Bleeding/blood clots remain to patient's bladder. Bladder irrigation continued at a fast rate. Will continue to monitor patient closely.

## 2021-03-25 ENCOUNTER — APPOINTMENT (OUTPATIENT)
Dept: MRI IMAGING | Age: 71
DRG: 663 | End: 2021-03-25
Payer: OTHER GOVERNMENT

## 2021-03-25 LAB — SURGICAL PATHOLOGY REPORT: NORMAL

## 2021-03-25 PROCEDURE — 97530 THERAPEUTIC ACTIVITIES: CPT | Performed by: NURSE PRACTITIONER

## 2021-03-25 PROCEDURE — 6360000002 HC RX W HCPCS: Performed by: UROLOGY

## 2021-03-25 PROCEDURE — A9579 GAD-BASE MR CONTRAST NOS,1ML: HCPCS | Performed by: UROLOGY

## 2021-03-25 PROCEDURE — 6360000004 HC RX CONTRAST MEDICATION: Performed by: UROLOGY

## 2021-03-25 PROCEDURE — 74183 MRI ABD W/O CNTR FLWD CNTR: CPT

## 2021-03-25 PROCEDURE — 2580000003 HC RX 258: Performed by: UROLOGY

## 2021-03-25 PROCEDURE — 97110 THERAPEUTIC EXERCISES: CPT | Performed by: NURSE PRACTITIONER

## 2021-03-25 PROCEDURE — APPSS45 APP SPLIT SHARED TIME 31-45 MINUTES: Performed by: NURSE PRACTITIONER

## 2021-03-25 PROCEDURE — 6370000000 HC RX 637 (ALT 250 FOR IP): Performed by: UROLOGY

## 2021-03-25 PROCEDURE — 1200000000 HC SEMI PRIVATE

## 2021-03-25 PROCEDURE — 51700 IRRIGATION OF BLADDER: CPT

## 2021-03-25 PROCEDURE — 99232 SBSQ HOSP IP/OBS MODERATE 35: CPT | Performed by: INTERNAL MEDICINE

## 2021-03-25 PROCEDURE — 72197 MRI PELVIS W/O & W/DYE: CPT

## 2021-03-25 RX ADMIN — METOPROLOL TARTRATE 50 MG: 50 TABLET, FILM COATED ORAL at 21:28

## 2021-03-25 RX ADMIN — CEFTRIAXONE SODIUM 1000 MG: 1 INJECTION, POWDER, FOR SOLUTION INTRAMUSCULAR; INTRAVENOUS at 17:18

## 2021-03-25 RX ADMIN — SULFASALAZINE 1000 MG: 500 TABLET ORAL at 09:01

## 2021-03-25 RX ADMIN — DOXAZOSIN 1 MG: 1 TABLET ORAL at 21:28

## 2021-03-25 RX ADMIN — METOPROLOL TARTRATE 50 MG: 50 TABLET, FILM COATED ORAL at 09:02

## 2021-03-25 RX ADMIN — SODIUM CHLORIDE: 9 INJECTION, SOLUTION INTRAVENOUS at 14:12

## 2021-03-25 RX ADMIN — SULFASALAZINE 1000 MG: 500 TABLET ORAL at 21:28

## 2021-03-25 RX ADMIN — PAROXETINE HYDROCHLORIDE 40 MG: 20 TABLET, FILM COATED ORAL at 09:02

## 2021-03-25 RX ADMIN — GADOTERIDOL 18 ML: 279.3 INJECTION, SOLUTION INTRAVENOUS at 13:29

## 2021-03-25 RX ADMIN — LISINOPRIL 20 MG: 20 TABLET ORAL at 09:01

## 2021-03-25 NOTE — PROGRESS NOTES
[facility]  Office: [phone]  Wade Eve, DO, Navi Cerna, DO, Orion Alarcon DO, Ravi Vargas Blood, DO, Tanner Reyes MD, Roman Puri MD, Elke Sosa MD, Mukesh Melendez MD, Norma Holman MD, Madhuri Jain MD, Chava Taylor MD, Neto Faulkner MD, Mahnaz Wolfe MD, Ilsa Laws DO, Rosalinda Gibbs MD, Candie Boas, DO, Corbin Fernando MD,  Elmer Peterson DO, Danisha Pickens MD, Rueben Severin, MD, [facility], [facility], [facility], CNP, Blaine Méndez, CNP, Ez Barker, CNS, Landon Beltran, Monisha Camejo, CNP, Sheba Pedraza, CNP, Ellen Aguilar, CNP, Uma Lopez, CNP, Sandy Ybarra PA-C, Lin Polo, [facility], Paul Ramos, CNP, Cassandra Merchant, CNP, Jcarlos Wells, CNP, Abisai Lewis, CNP, Raquel Bush, [facility], [facility], [facility]    Progress Note    3/25/2021    8:04 AM    Name:   Gabriela Munguia  MRN:     [id number]     Acct:      [de-identified]   Room:   2022/202201   Day:  3  Admit Date:  3/22/2021  1:56 PM    PCP:   No primary care provider on file. Code Status:  Full Code    Subjective:     C/C:   Chief Complaint   Patient presents with    Abdominal Pain     onset 1000    Hematuria     Interval History Status: Improved. Patient is without complaint today. Bladder irrigation continues and urine return is clear yellow without sediment or clots at the time my exam.  Nurse reports that she had just drained the Stone bag and at that time the urine was still pink with signs of hemorrhage. None currently. We await urology direction on bladder irrigation. Patient is scheduled for MRI abdomen and pelvis later today. Patient will require continued follow-up with his urology team at the [address] [place] [address]. Brief History:     3/22 - Patient reports that at approximately 10 AM he went to urinate and appreciated a large amount of hematuria including mary blood as well as large clots.   Shortly thereafter patient developed suprapubic abdominal pain that he rated at severe and would come and go in waves. Patient reported the emergency department where a CT scan was completed and questionable bladder hematoma versus mass was identified. Stone catheter was placed and patient was placed on three-way irrigation. Urinalysis reveals UTI.     3/23 -Little change in condition. Three-way Stone catheter remains in place with bloody return. Blood and urine cultures negative thus far. We await urology direction on hospital course. 3/24 -condition improved. Bladder irrigation continues per urology. Return is pink with less clots today. No pain or distention reported. 3/25 -condition again improved. Bladder irrigation continues. Urine clear yellow today. Plans for MRI abdomen pelvis. Review of Systems:     Constitutional:  negative for chills, fevers, sweats  Respiratory:  negative for cough, dyspnea on exertion, shortness of breath, wheezing  Cardiovascular:  negative for chest pain, chest pressure/discomfort, lower extremity edema, palpitations  Gastrointestinal:  negative for abdominal pain, constipation, diarrhea, nausea, vomiting. Does endorse intermittent suprapubic pain when fully return becomes occluded. Neurological:  negative for dizziness, headache    Medications:      Allergies:  No Known Allergies    Current Meds:   Scheduled Meds:    lisinopril  20 mg Oral Daily    metoprolol tartrate  50 mg Oral BID    PARoxetine  40 mg Oral QAM    sulfaSALAzine  1,000 mg Oral BID    doxazosin  1 mg Oral Nightly    sodium chloride flush  10 mL Intravenous 2 times per day    cefTRIAXone (ROCEPHIN) IV  1,000 mg Intravenous Q24H     Continuous Infusions:    sodium chloride 75 mL/hr at 03/24/21 2344     PRN Meds: lidocaine, lidocaine, sodium chloride flush, potassium chloride **OR** potassium alternative oral replacement **OR** potassium chloride, magnesium sulfate, promethazine **OR** ondansetron, polyethylene glycol, nicotine, acetaminophen **OR** acetaminophen, morphine    Data:     Past Medical History:   has a past medical history of Arthritis, CAD (coronary artery disease), Cancer ([address] Chronic kidney disease, Depression, History of kidney cancer, Hyperlipidemia, and Hypertension. Social History:   reports that he has been smoking. He has been smoking about 1.00 pack per day. He has never used smokeless tobacco. He reports current alcohol use. He reports that he does not use drugs. Family History:   Family History   Problem Relation Age of Onset    High Blood Pressure Mother     Coronary Art Dis Father     Heart Attack Father     High Blood Pressure Father     Diabetes Father        Vitals:  BP (!) 110/51   Pulse 69   Temp 98.4 °F (36.9 °C) (Oral)   Resp 13   Ht 5' 6\" (1.676 m)   Wt 205 lb 1.6 oz (93 kg)   SpO2 95%   BMI 33.10 kg/m²   Temp (24hrs), Av.2 °F (36.8 °C), Min:97.9 °F (36.6 °C), Max:98.4 °F (36.9 °C)    No results for input(s): POCGLU in the last 72 hours. I/O (24Hr):     Intake/Output Summary (Last 24 hours) at 3/25/2021 0804  Last data filed at 3/25/2021 0756  Gross per 24 hour   Intake 910 ml   Output 5950 ml   Net -5040 ml       Labs:  Hematology:  Recent Labs     21  1445 21  0602 21  0827   WBC 9.8 12.3* 10.4   RBC 3.80* 3.59* 3.46*   HGB 12.3* 11.4* 11.0*   HCT 35.8* 33.5* 32.6*   MCV 94.2 93.3 94.2   MCH 32.4 31.8 31.8   MCHC 34.4 34.0 33.7   RDW 12.3 12.2 12.4    192 179   MPV 9.3 9.5 8.6   INR 0.9 1.1  --      Chemistry:  Recent Labs     21  1445 21  0602 21  0827   * 140 139   K 4.2 4.2 4.0    108* 105   CO2 21 23 22   GLUCOSE 85 105* 111*   BUN 16 15 12   CREATININE 0.92 0.91 0.77   ANIONGAP 8* 9 12   LABGLOM >60 >60 >60   GFRAA >60 >60 >60   CALCIUM 9.4 9.0 8.7   No results for input(s): PROT, LABALBU, LABA1C, X1IYSIZ, N8HHWMN, FT4, TSH, AST, ALT, LDH, GGT, ALKPHOS, LABGGT, BILITOT, BILIDIR, AMMONIA, AMYLASE, LIPASE, LACTATE, CHOL, HDL, LDLCHOLESTEROL, CHOLHDLRATIO, TRIG, VLDL, SMM03JK, PHENYTOIN, PHENYF, URICACID, POCGLU in the last 72 hours. ABG:No results found for: POCPH, PHART, PH, POCPCO2, RGA9QYZ, PCO2, POCPO2, PO2ART, PO2, POCHCO3, MKD3RAV, HCO3, NBEA, PBEA, BEART, BE, THGBART, THB, AMD5ZNV, TYFW7RBB, B5XDOGPO, O2SAT, FIO2  Lab Results   Component Value Date/Time    SPECIAL LT [facility] 03/22/2021 07:16 PM     Lab Results   Component Value Date/Time    CULTURE NO GROWTH 3 DAYS 03/22/2021 07:16 PM       Radiology:  Ct Abdomen Pelvis Wo Contrast Additional Contrast? None    Result Date: 3/22/2021  1. Solitary right kidney demonstrating moderate hydronephrosis and mild hydroureter without obstructing ureteric lesion. 2. A swirling moderately hyperdense material in the urinary bladder about 8 x 8 cm in the dependent portion. Hematoma versus mass with hemorrhage. 3. Mild prostatomegaly. Physical Examination:        General appearance:  alert, cooperative and no distress  Mental Status:  oriented to person, place and time and normal affect  Lungs:  clear to auscultation bilaterally, normal effort  Heart:  regular rate and rhythm, no murmur  Abdomen:  soft, nontender, nondistended, normal bowel sounds, no masses, hepatomegaly, splenomegaly. Genitourinary:     Comments: Three-way Stone catheter is in place. No external bleeding noted around the urethra. Stone bag contains clear yellow urine without sediment or clots.   Extremities:  no edema, redness, tenderness in the calves  Skin:  no gross lesions, rashes, induration    Assessment:        Hospital Problems           Last Modified POA    * (Principal) Acute cystitis with hematuria 3/22/2021 Yes    History of kidney cancer 3/22/2021 Yes    Chronic kidney disease 3/22/2021 Yes    CAD (coronary artery disease) 3/22/2021 Yes    Depression 3/22/2021 Yes    Hyperlipidemia 3/22/2021 Yes    Hypertension 3/22/2021 Yes    H/O ulcerative colitis 3/22/2021 Yes    PTSD (post-traumatic stress disorder) 3/22/2021 Yes    S/P lobectomy of lung 3/22/2021 Yes    Hematoma versus mass of urinary bladder determined by ultrasound 3/22/2021 Yes    Hemorrhagic cystitis 3/24/2021 Yes          Plan:        1. Acute cystitis with hematuria and urinary retention  1. IV hydration with IV Rocephin every 24 hours  2. Maintain three-way Stone catheter and irrigation as directed by urology -urine clear and yellow at the time my exam  3. Await further urology direction on hospital course. 4. MRI abdomen and pelvis today  2. CKD  1. Closely monitor renal function -stable  3. Depression and PTSD  1. Paxil -stable  4. Hypertension   1.  Lisinopril, metoprolol -stable    DIXON Le - NP  3/25/2021  8:04 AM

## 2021-03-25 NOTE — PROGRESS NOTES
Physical Therapy  DATE: 3/25/2021    NAME: Leslee Crowley  MRN: [id number]   : 1950    Patient not seen this date for Physical Therapy due to:  [] Blood transfusion in progress  [] Cancel by RN  [] Hemodialysis  []  Refusal by Patient   [] Spine Precautions   [] Strict Bedrest  [] Surgery  [x] Testing (Patient leaving room for MRI)      [] Other        [] PT being discontinued at this time. Patient independent. No further needs. [] PT being discontinued at this time as the patient has been transferred to hospice care. No further needs.     Pushpa Alejandra, PTA

## 2021-03-25 NOTE — PROGRESS NOTES
Moisés Shen   Urology Progress Note            Subjective: Bladder mass, solitary kidney with gross hematuria    Patient Vitals for the past 24 hrs:   BP Temp Temp src Pulse Resp SpO2 Weight   03/25/21 0359 (!) 110/51 98.4 °F (36.9 °C) Oral 69 13 95 % --   03/24/21 2329 (!) 114/52 97.9 °F (36.6 °C) Oral 66 14 97 % --   03/24/21 1906 (!) 120/54 98.2 °F (36.8 °C) Oral 64 18 97 % --   03/24/21 1538 (!) 117/50 98.4 °F (36.9 °C) Oral 63 18 97 % --   03/24/21 0715 (!) 111/49 97.6 °F (36.4 °C) Oral 71 19 96 % --   03/24/21 0620 -- -- -- -- -- -- 205 lb 1.6 oz (93 kg)       Intake/Output Summary (Last 24 hours) at 3/25/2021 0526  Last data filed at 3/24/2021 2049  Gross per 24 hour   Intake 1906 ml   Output 4675 ml   Net -2769 ml       Recent Labs     03/22/21  1445 03/23/21  0602 03/24/21  0827   WBC 9.8 12.3* 10.4   HGB 12.3* 11.4* 11.0*   HCT 35.8* 33.5* 32.6*   MCV 94.2 93.3 94.2    192 179     Recent Labs     03/22/21  1445 03/23/21  0602 03/24/21  0827   * 140 139   K 4.2 4.2 4.0    108* 105   CO2 21 23 22   BUN 16 15 12   CREATININE 0.92 0.91 0.77       Recent Labs     03/22/21  1350   COLORU RED*   PHUR 8.0   WBCUA 10 TO 20   RBCUA TOO NUMEROUS TO COUNT   MUCUS NOT REPORTED   TRICHOMONAS NOT REPORTED   YEAST NOT REPORTED   BACTERIA NOT REPORTED   SPECGRAV 1.010   LEUKOCYTESUR LARGE*   UROBILINOGEN ELEVATED*   BILIRUBINUR NEGATIVE       Additional Lab/culture results:    Physical Exam: Patient alert oriented not in acute distress I have recommended an MRI of the kidney and MRI of the pelvis to evaluate the mass on the solitary kidney and to evaluate for involvement of the bladder in any tumors that could be bleeding  There was no enhancing masses in the bladder just recurrent hemorrhagic cystitis with blood clots    Both imaging reviewed, the MRI of the kidney shows a cystic benign mass on the solitary kidney    The MRI of the pelvis does not show any enhancing bladder cancer    Interval Imaging Findings: No malignancy identified by imaging, blood clots identified in the bladder from severe hemorrhagic cystitis    Impression: Severe hemorrhagic cystitis with clot retention no clinical evidence of malignancy  Patient Active Problem List   Diagnosis    History of kidney cancer    Chronic kidney disease    CAD (coronary artery disease)    Depression    Hyperlipidemia    Hypertension    H/O ulcerative colitis    Acute cystitis with hematuria    PTSD (post-traumatic stress disorder)    S/P lobectomy of lung    Hematoma versus mass of urinary bladder determined by ultrasound    Hematuria    Hemorrhagic cystitis       Plan:  We will discuss the option of cauterization of bleeders and removing the catheter depending on the amount of hematuria remaining        Chris Ellington  5:26 AM 3/25/2021

## 2021-03-25 NOTE — PROGRESS NOTES
Called to pt room for c/o bladder spasm/ fullness. Hand irrigated with saline and several sm/large clots expelled with relief for pt.  Current;y eating lunch

## 2021-03-25 NOTE — CARE COORDINATION
Phone call to Talita Gonzalez at the [facility] at [phone] and she provided me with urology's number to contact regarding making an appt with them for March 31. Phone call to urology at [phone] and message left await call back to confirm an appt and time for March 31. At 1315 received phone call from urology and they confirm they can see pt March 31 at 8:30am. Nurse Charol Primrose and pt and his wife informed.
with the [place] [facility] in [facility] and in [facility].   Consults: PT/OT and Urology  Plan is a Cystoscopy tomorrow  Plan is home with spouse independently vs HC  Continue to follow  PT/OT to eval.          Electronically signed by Rama House RN on 3/23/21 at 12:26 PM EDT

## 2021-03-25 NOTE — PROGRESS NOTES
S/P lobectomy of lung    Hematoma versus mass of urinary bladder determined by ultrasound    Hematuria    Hemorrhagic cystitis       Plan: Patient follows up with urology at the [place] in [place] we will contact the [place] to make an appointment for the patient for next week    He has a solitary kidney which is also being monitored by urology in [address] [place] [address] Rd  5:23 AM 3/25/2021

## 2021-03-25 NOTE — PROGRESS NOTES
Hand irrigated for clots at 1530, numerous small clots expelled.  CBI returns have been clear pink since infusing at moderate rate

## 2021-03-25 NOTE — PROGRESS NOTES
Occupational Therapy  Facility/Department: [facility]  Daily Treatment Note  NAME: Avery Magallon  : 1950  MRN: [id number]    Date of Service: 3/25/2021    Discharge Recommendations:  Home with Home health OT, Home with assist PRN  OT Equipment Recommendations  ADL Assistive Devices: Toileting - 3-in-1 Commode;Grab Bars - shower; Reacher;Long-handled Shoe Horn;Long-handled Sponge;Emergency Alert System    Assessment   Performance deficits / Impairments: Decreased functional mobility ; Decreased safe awareness;Decreased balance;Decreased ADL status; Decreased endurance;Decreased high-level IADLs  Assessment: OT is recommended for this to increase I/safety in function as well as act alexandre and balance to return home with assist as needed. Prognosis: Good  OT Education: OT Role;Plan of Care;Family Education; Energy Conservation;Home Exercise Program;IADL Safety  REQUIRES OT FOLLOW UP: Yes  Activity Tolerance  Activity Tolerance: Patient Tolerated treatment well  Safety Devices  Safety Devices in place: Yes  Type of devices: Call light within reach; Left in chair;Patient at risk for falls;Gait belt;Nurse notified; All fall risk precautions in place         Patient Diagnosis(es): The primary encounter diagnosis was Hematuria, unspecified type. A diagnosis of Urinary tract infection with hematuria, site unspecified was also pertinent to this visit. has a past medical history of Arthritis, CAD (coronary artery disease), Cancer ([facility] [address] Chronic kidney disease, Depression, History of kidney cancer, Hyperlipidemia, and Hypertension. has a past surgical history that includes Kidney removal (Left); Cholecystectomy; Lung removal, partial (Right); eye surgery; Cardiac surgery; Coronary angioplasty with stent; skin biopsy; knee surgery; and Cystoscopy (N/A, 3/23/2021).     Restrictions  Restrictions/Precautions  Restrictions/Precautions: General Precautions, Fall Risk  Position Activity Restriction  Other position/activity restrictions: up with assist, ROSANNA, has continuous bladder irrigation, morales & RUE IV  Subjective   General  Chart Reviewed: Yes  Patient assessed for rehabilitation services?: Yes  Response to previous treatment: Patient with no complaints from previous session  Family / Caregiver Present: Yes(Wife and daughter)  Subjective  Subjective: Pt stating he would like ECWS re: IADLs and that he \"doesnt like to sit still\". Oxygen Therapy  O2 Device: None (Room air)   Orientation  Orientation  Overall Orientation Status: Within Functional Limits  Objective    Seated Shoulder Diagonal Pulls with Resistance - 1 x daily - 7 x weekly - 10 reps - 3 sets   Seated Shoulder Flexion with Self-Anchored Resistance - 1 x daily - 7 x weekly - 10 reps - 3 sets   Shoulder Lat Pull Down with Resistance - 1 x daily - 7 x weekly - 10 reps - 3 sets   Seated Shoulder Abduction with Resistance - 1 x daily - 7 x weekly - 10 reps - 3 sets  Patient Education   Understanding Energy Conservation   Home Management   Housekeeping   Yard Maintenance   Cognition  Overall Cognitive Status: Exceptions  Cognition Comment: Pt appears to have slight cognitive deficits. Answering questions and engaging in coversation with very short, vague answers with questionable. Very pleasant.        Plan   Plan  Times per week: 4-5x/week 1x/day as alexandre  Current Treatment Recommendations: Strengthening, Balance Training, Functional Mobility Training, Safety Education & Training, Endurance Training, Patient/Caregiver Education & Training, Self-Care / ADL, Home Management Training, Neuromuscular Re-education, Pain Management    AM-PAC Score        AM-PAC Inpatient Daily Activity Raw Score: 21 (03/25/21 1120)  AM-PAC Inpatient ADL T-Scale Score : 44.27 (03/25/21 1120)  ADL Inpatient CMS 0-100% Score: 32.79 (03/25/21 1120)  ADL Inpatient CMS G-Code Modifier : Josias Bacon (03/25/21 1120)    Goals  Short term goals  Time Frame for Short term goals: by discharge, pt to demo  Short term goal 1: bed mob tasks with use of rail as needed to MOD I. Short term goal 2: I with ADL transfers and functional mob. Short term goal 3: total body ADL tasks with use of AE/grab bar as needed to MOD I. Short term goal 4: standing to SUP and alexandre > 8 mins as able to reduce falls. Short term goal 5: I with BUE HEP with use of handouts to maintain strength. Long term goals  Long term goal 1: Pt/family to be I with EC/WS and fall prevention tech as well as DME/AE recommendations with use of handouts. Patient Goals   Patient goals : Get home today! Therapy Time   Individual Concurrent Group Co-treatment   Time In 1055         Time Out 1115(Additional 5 mins creating personalized HEP and edu)         Minutes 20           Upon writer exit, call light within reach, pt retired to chair. All lines intact and patient positioned comfortably. Chair alarm in place. All patient needs addressed prior to ending therapy session. Chart reviewed prior to treatment and patient is agreeable for therapy. RN reports patient is medically stable for therapy treatment this date.        ALIYA Crespo

## 2021-03-25 NOTE — PLAN OF CARE
Problem: Safety:  Goal: Free from accidental physical injury  Description: Free from accidental physical injury  3/24/2021 2004 by Israel Meza RN  Outcome: Ongoing     Problem: Pain:  Goal: Patient's pain/discomfort is manageable  Description: Patient's pain/discomfort is manageable  3/24/2021 2004 by Israel Meza RN  Outcome: Ongoing     Problem: Bleeding:  Goal: Will show no signs and symptoms of excessive bleeding  Description: Will show no signs and symptoms of excessive bleeding  3/24/2021 2004 by Israel Meza RN  Outcome: Ongoing

## 2021-03-26 ENCOUNTER — ANESTHESIA EVENT (OUTPATIENT)
Dept: OPERATING ROOM | Age: 71
DRG: 663 | End: 2021-03-26
Payer: OTHER GOVERNMENT

## 2021-03-26 ENCOUNTER — ANESTHESIA (OUTPATIENT)
Dept: OPERATING ROOM | Age: 71
DRG: 663 | End: 2021-03-26
Payer: OTHER GOVERNMENT

## 2021-03-26 VITALS — DIASTOLIC BLOOD PRESSURE: 64 MMHG | OXYGEN SATURATION: 100 % | SYSTOLIC BLOOD PRESSURE: 129 MMHG | TEMPERATURE: 97 F

## 2021-03-26 VITALS
OXYGEN SATURATION: 97 % | RESPIRATION RATE: 16 BRPM | HEIGHT: 66 IN | SYSTOLIC BLOOD PRESSURE: 125 MMHG | TEMPERATURE: 97.7 F | WEIGHT: 207 LBS | BODY MASS INDEX: 33.27 KG/M2 | DIASTOLIC BLOOD PRESSURE: 60 MMHG | HEART RATE: 59 BPM

## 2021-03-26 LAB
ABSOLUTE EOS #: 0.24 K/UL (ref 0–0.44)
ABSOLUTE IMMATURE GRANULOCYTE: 0.03 K/UL (ref 0–0.3)
ABSOLUTE LYMPH #: 1.75 K/UL (ref 1.1–3.7)
ABSOLUTE MONO #: 0.62 K/UL (ref 0.1–1.2)
ANION GAP SERPL CALCULATED.3IONS-SCNC: 9 MMOL/L (ref 9–17)
BASOPHILS # BLD: 1 % (ref 0–2)
BASOPHILS ABSOLUTE: 0.06 K/UL (ref 0–0.2)
BUN BLDV-MCNC: 10 MG/DL (ref 8–23)
BUN/CREAT BLD: 12 (ref 9–20)
CALCIUM SERPL-MCNC: 8.7 MG/DL (ref 8.6–10.4)
CHLORIDE BLD-SCNC: 110 MMOL/L (ref 98–107)
CO2: 23 MMOL/L (ref 20–31)
CREAT SERPL-MCNC: 0.81 MG/DL (ref 0.7–1.2)
DIFFERENTIAL TYPE: ABNORMAL
EOSINOPHILS RELATIVE PERCENT: 3 % (ref 1–4)
GFR AFRICAN AMERICAN: >60 ML/MIN
GFR NON-AFRICAN AMERICAN: >60 ML/MIN
GFR SERPL CREATININE-BSD FRML MDRD: ABNORMAL ML/MIN/{1.73_M2}
GFR SERPL CREATININE-BSD FRML MDRD: ABNORMAL ML/MIN/{1.73_M2}
GLUCOSE BLD-MCNC: 111 MG/DL (ref 70–99)
HCT VFR BLD CALC: 29.1 % (ref 40.7–50.3)
HEMOGLOBIN: 9.8 G/DL (ref 13–17)
IMMATURE GRANULOCYTES: 0 %
INR BLD: 1
LYMPHOCYTES # BLD: 23 % (ref 24–43)
MCH RBC QN AUTO: 31.7 PG (ref 25.2–33.5)
MCHC RBC AUTO-ENTMCNC: 33.7 G/DL (ref 28.4–34.8)
MCV RBC AUTO: 94.2 FL (ref 82.6–102.9)
MONOCYTES # BLD: 8 % (ref 3–12)
NRBC AUTOMATED: 0 PER 100 WBC
PDW BLD-RTO: 12.4 % (ref 11.8–14.4)
PLATELET # BLD: 201 K/UL (ref 138–453)
PLATELET ESTIMATE: ABNORMAL
PMV BLD AUTO: 9.4 FL (ref 8.1–13.5)
POTASSIUM SERPL-SCNC: 4.1 MMOL/L (ref 3.7–5.3)
PROSTATE SPECIFIC ANTIGEN: 5.15 UG/L
PROTHROMBIN TIME: 12.8 SEC (ref 11.5–14.2)
RBC # BLD: 3.09 M/UL (ref 4.21–5.77)
RBC # BLD: ABNORMAL 10*6/UL
SEG NEUTROPHILS: 65 % (ref 36–65)
SEGMENTED NEUTROPHILS ABSOLUTE COUNT: 5.02 K/UL (ref 1.5–8.1)
SODIUM BLD-SCNC: 142 MMOL/L (ref 135–144)
WBC # BLD: 7.7 K/UL (ref 3.5–11.3)
WBC # BLD: ABNORMAL 10*3/UL

## 2021-03-26 PROCEDURE — 6360000002 HC RX W HCPCS: Performed by: UROLOGY

## 2021-03-26 PROCEDURE — 2500000003 HC RX 250 WO HCPCS: Performed by: NURSE ANESTHETIST, CERTIFIED REGISTERED

## 2021-03-26 PROCEDURE — 6360000002 HC RX W HCPCS: Performed by: NURSE ANESTHETIST, CERTIFIED REGISTERED

## 2021-03-26 PROCEDURE — APPSS45 APP SPLIT SHARED TIME 31-45 MINUTES: Performed by: NURSE PRACTITIONER

## 2021-03-26 PROCEDURE — 36415 COLL VENOUS BLD VENIPUNCTURE: CPT

## 2021-03-26 PROCEDURE — 99232 SBSQ HOSP IP/OBS MODERATE 35: CPT | Performed by: INTERNAL MEDICINE

## 2021-03-26 PROCEDURE — 3700000001 HC ADD 15 MINUTES (ANESTHESIA): Performed by: UROLOGY

## 2021-03-26 PROCEDURE — 3600000002 HC SURGERY LEVEL 2 BASE: Performed by: UROLOGY

## 2021-03-26 PROCEDURE — 6370000000 HC RX 637 (ALT 250 FOR IP): Performed by: UROLOGY

## 2021-03-26 PROCEDURE — 2709999900 HC NON-CHARGEABLE SUPPLY: Performed by: UROLOGY

## 2021-03-26 PROCEDURE — 85610 PROTHROMBIN TIME: CPT

## 2021-03-26 PROCEDURE — 2580000003 HC RX 258: Performed by: UROLOGY

## 2021-03-26 PROCEDURE — 3600000012 HC SURGERY LEVEL 2 ADDTL 15MIN: Performed by: UROLOGY

## 2021-03-26 PROCEDURE — 7100000001 HC PACU RECOVERY - ADDTL 15 MIN: Performed by: UROLOGY

## 2021-03-26 PROCEDURE — 85025 COMPLETE CBC W/AUTO DIFF WBC: CPT

## 2021-03-26 PROCEDURE — 7100000000 HC PACU RECOVERY - FIRST 15 MIN: Performed by: UROLOGY

## 2021-03-26 PROCEDURE — 80048 BASIC METABOLIC PNL TOTAL CA: CPT

## 2021-03-26 PROCEDURE — 0W3R8ZZ CONTROL BLEEDING IN GENITOURINARY TRACT, VIA NATURAL OR ARTIFICIAL OPENING ENDOSCOPIC: ICD-10-PCS | Performed by: UROLOGY

## 2021-03-26 PROCEDURE — 84153 ASSAY OF PSA TOTAL: CPT

## 2021-03-26 PROCEDURE — 3700000000 HC ANESTHESIA ATTENDED CARE: Performed by: UROLOGY

## 2021-03-26 RX ORDER — GLYCOPYRROLATE 1 MG/5 ML
SYRINGE (ML) INTRAVENOUS PRN
Status: DISCONTINUED | OUTPATIENT
Start: 2021-03-26 | End: 2021-03-26 | Stop reason: SDUPTHER

## 2021-03-26 RX ORDER — EPHEDRINE SULFATE/0.9% NACL/PF 50 MG/5 ML
SYRINGE (ML) INTRAVENOUS PRN
Status: DISCONTINUED | OUTPATIENT
Start: 2021-03-26 | End: 2021-03-26 | Stop reason: SDUPTHER

## 2021-03-26 RX ORDER — PHENYLEPHRINE HCL IN 0.9% NACL 1 MG/10 ML
SYRINGE (ML) INTRAVENOUS PRN
Status: DISCONTINUED | OUTPATIENT
Start: 2021-03-26 | End: 2021-03-26 | Stop reason: SDUPTHER

## 2021-03-26 RX ORDER — CIPROFLOXACIN 500 MG/1
500 TABLET, FILM COATED ORAL 2 TIMES DAILY
Qty: 8 TABLET | Refills: 0 | Status: SHIPPED | OUTPATIENT
Start: 2021-03-26 | End: 2021-03-30

## 2021-03-26 RX ORDER — LIDOCAINE HYDROCHLORIDE 20 MG/ML
JELLY TOPICAL PRN
Status: DISCONTINUED | OUTPATIENT
Start: 2021-03-26 | End: 2021-03-26 | Stop reason: HOSPADM

## 2021-03-26 RX ORDER — TAMSULOSIN HYDROCHLORIDE 0.4 MG/1
0.4 CAPSULE ORAL ONCE
Status: COMPLETED | OUTPATIENT
Start: 2021-03-26 | End: 2021-03-26

## 2021-03-26 RX ORDER — PROPOFOL 10 MG/ML
INJECTION, EMULSION INTRAVENOUS PRN
Status: DISCONTINUED | OUTPATIENT
Start: 2021-03-26 | End: 2021-03-26 | Stop reason: SDUPTHER

## 2021-03-26 RX ORDER — DEXAMETHASONE SODIUM PHOSPHATE 10 MG/ML
INJECTION, SOLUTION INTRAMUSCULAR; INTRAVENOUS PRN
Status: DISCONTINUED | OUTPATIENT
Start: 2021-03-26 | End: 2021-03-26 | Stop reason: SDUPTHER

## 2021-03-26 RX ORDER — TAMSULOSIN HYDROCHLORIDE 0.4 MG/1
0.4 CAPSULE ORAL DAILY
Qty: 30 CAPSULE | Refills: 1 | Status: SHIPPED | OUTPATIENT
Start: 2021-03-26

## 2021-03-26 RX ORDER — FENTANYL CITRATE 50 UG/ML
INJECTION, SOLUTION INTRAMUSCULAR; INTRAVENOUS PRN
Status: DISCONTINUED | OUTPATIENT
Start: 2021-03-26 | End: 2021-03-26 | Stop reason: SDUPTHER

## 2021-03-26 RX ORDER — ONDANSETRON 2 MG/ML
INJECTION INTRAMUSCULAR; INTRAVENOUS PRN
Status: DISCONTINUED | OUTPATIENT
Start: 2021-03-26 | End: 2021-03-26 | Stop reason: SDUPTHER

## 2021-03-26 RX ORDER — LIDOCAINE HYDROCHLORIDE 20 MG/ML
INJECTION, SOLUTION INFILTRATION; PERINEURAL PRN
Status: DISCONTINUED | OUTPATIENT
Start: 2021-03-26 | End: 2021-03-26 | Stop reason: SDUPTHER

## 2021-03-26 RX ADMIN — SODIUM CHLORIDE: 9 INJECTION, SOLUTION INTRAVENOUS at 04:17

## 2021-03-26 RX ADMIN — DEXAMETHASONE SODIUM PHOSPHATE 10 MG: 10 INJECTION INTRAMUSCULAR; INTRAVENOUS at 11:23

## 2021-03-26 RX ADMIN — CEFTRIAXONE SODIUM 1000 MG: 1 INJECTION, POWDER, FOR SOLUTION INTRAMUSCULAR; INTRAVENOUS at 17:33

## 2021-03-26 RX ADMIN — Medication 50 MCG: at 11:19

## 2021-03-26 RX ADMIN — SULFASALAZINE 1000 MG: 500 TABLET ORAL at 09:09

## 2021-03-26 RX ADMIN — Medication 0.2 MG: at 11:34

## 2021-03-26 RX ADMIN — TAMSULOSIN HYDROCHLORIDE 0.4 MG: 0.4 CAPSULE ORAL at 16:44

## 2021-03-26 RX ADMIN — LIDOCAINE HYDROCHLORIDE 100 MG: 20 INJECTION, SOLUTION INFILTRATION; PERINEURAL at 11:19

## 2021-03-26 RX ADMIN — ONDANSETRON 4 MG: 2 INJECTION, SOLUTION INTRAMUSCULAR; INTRAVENOUS at 11:23

## 2021-03-26 RX ADMIN — Medication 100 MCG: at 11:28

## 2021-03-26 RX ADMIN — Medication 50 MCG: at 11:23

## 2021-03-26 RX ADMIN — PROPOFOL 200 MG: 10 INJECTION, EMULSION INTRAVENOUS at 11:19

## 2021-03-26 RX ADMIN — METOPROLOL TARTRATE 50 MG: 50 TABLET, FILM COATED ORAL at 09:08

## 2021-03-26 RX ADMIN — Medication 10 MG: at 11:31

## 2021-03-26 RX ADMIN — PAROXETINE HYDROCHLORIDE 40 MG: 20 TABLET, FILM COATED ORAL at 09:09

## 2021-03-26 RX ADMIN — LISINOPRIL 20 MG: 20 TABLET ORAL at 09:09

## 2021-03-26 RX ADMIN — Medication 10 MG: at 11:37

## 2021-03-26 ASSESSMENT — PULMONARY FUNCTION TESTS
PIF_VALUE: 16
PIF_VALUE: 15
PIF_VALUE: 4
PIF_VALUE: 15
PIF_VALUE: 0
PIF_VALUE: 1
PIF_VALUE: 15
PIF_VALUE: 5
PIF_VALUE: 2
PIF_VALUE: 4
PIF_VALUE: 1
PIF_VALUE: 15
PIF_VALUE: 1
PIF_VALUE: 4
PIF_VALUE: 1
PIF_VALUE: 19

## 2021-03-26 ASSESSMENT — PAIN SCALES - GENERAL
PAINLEVEL_OUTOF10: 0
PAINLEVEL_OUTOF10: 3

## 2021-03-26 ASSESSMENT — PAIN DESCRIPTION - LOCATION: LOCATION: GROIN

## 2021-03-26 NOTE — OP NOTE
there was 2 areas with persistent bleeding with attached clots, pathology report on the clot evacuation did not demonstrate any evidence of any malignant cells    MRI of the pelvis did not show any clinical or radiographic evidence of a malignant bladder tumor    Patient continued to have gross hematuria consequently we scheduled him for cauterization of bleeders it should also be noted the MRI of the kidney did not demonstrate any malignant renal mass    Detailed Description of Procedure:   Patient was brought to the operating room positioned in dorsolithotomy prepping draping in the usual sterile manner. We entered the bladder with the cystoscope, careful examination of the bladder revealed evidence of bleeding from the bladder mucosa near the bladder neck in the area that was previously noted with a cystoscopy under local anesthesia. We proceeded with cauterization using the Bugbee at the completion there was no residual bleeding. There is evidence of resolving hemorrhagic cystitis. The bladder is markedly trabeculated associated with prostate hypertrophy and bladder outlet obstruction. The patient has a solitary kidney with no hydronephrosis. At the completion the bladder was emptied the scope removed the patient was returned to recovery room in stable condition. Impression hemorrhagic cystitis presently under good control, patient has been on aspirin that has contributed to the bleeding, we asked the patient to stop the aspirin for the time being.     Recommendations patient can be discharged home when cleared by medicine    Follow-up visit at the office and at the [facility] where he receives his urologic care     Electronically signed by Bhupinder Plascencia MD on 3/26/2021 at 11:29 AM

## 2021-03-26 NOTE — PROGRESS NOTES
to the OR by urology today for cystoscopy and likely cauterization    Impression/Plan  Principal Problem:    Acute cystitis with hematuria  Active Problems:    Hematoma versus mass of urinary bladder determined by ultrasound    History of kidney cancer    Chronic kidney disease    CAD (coronary artery disease)    Depression    Hyperlipidemia    Hypertension    H/O ulcerative colitis    PTSD (post-traumatic stress disorder)    S/P lobectomy of lung    Hemorrhagic cystitis  Resolved Problems:    * No resolved hospital problems.  *        I reviewed and agree with the findings and plan documented in his note  Patient is likely to be discharged today if remains stable and is able to void after urology procedure of cystoscopy and cauterization      Electronically signed by Dorothy Regalado MD on 3/26/21 at 4:07 PM EDT              Dorothy Regalado MD  3/26/2021  4:06 PM

## 2021-03-26 NOTE — ANESTHESIA POSTPROCEDURE EVALUATION
Department of Anesthesiology  Postprocedure Note    Patient: Jabari Brown  MRN: [id number]  YOB: 1950  Date of evaluation: 3/26/2021  Time:  12:36 PM     Procedure Summary     Date: 03/26/21 Room / Location: [facility] - INPATIENT    Anesthesia Start: 1510 Anesthesia Stop: 1754    Procedure: CYSTOSCOPY   CAUTERIZATION OF BLEEDERS (N/A ) Diagnosis: (hematuria)    Surgeons: Chris Ellington MD Responsible Provider: Courtney Kellogg DO    Anesthesia Type: general ASA Status: 3          Anesthesia Type: general    Stewart Phase I: Stewart Score: 10    Stewart Phase II:      Last vitals: Reviewed and per EMR flowsheets.        Anesthesia Post Evaluation    Patient location during evaluation: PACU  Patient participation: complete - patient participated  Level of consciousness: awake and alert  Airway patency: patent  Nausea & Vomiting: no nausea and no vomiting  Complications: no  Cardiovascular status: hemodynamically stable  Respiratory status: acceptable  Hydration status: stable

## 2021-03-26 NOTE — PROGRESS NOTES
Dr Jermaine Carmona rounded, Flomax script given, OK to go home, states no need to rescan bladder since Flomax given.

## 2021-03-26 NOTE — DISCHARGE SUMMARY
[facility]  Office: [address], DO, Umachapin Cardenas, DO, Simon Stamp, DO, Akshat Schwartz Blood, DO, Aniya Oswald MD, Cecily Schlatter, MD, Carlos Brennan MD, Doni Ybarra MD, Hermelinda Bell MD, Atilio Scott MD, Jude Gilliam MD, Jem Paulino MD, Clovis Martinez DO, Jaydon Putnam MD, Marleny Gross DO, Stefania Perdomo MD,  Susan Eric DO, Paxton Lou MD, Jairo Jalloh MD, Ronaldo Lu MD, Audelia Menendez MD, Emi Acevedo, [facility], [facility], CNP, Shahram Miller, CNP, Patrick Machado, [facility], Izabel Honeycutt, [facility], Kahlil Moyer, CNP, Waylon Mckee, CNP, Esme Saldana, CNP, Nandini Smallwood, CNP, Viv Luna PA-C, Zan Diaz, [facility], Myrna Castellon, CNP, Paul Salazar, CNP, Anjana Peña, CNP, Roberta Wong, CNP, Audra Dale, [facility], Lavon Wells, [facility]    Discharge Summary     Patient ID: Malik Newman  :  1950   MRN: [id number]     ACCOUNT:  [de-identified]   Patient's PCP: No primary care provider on file. Admit Date: 3/22/2021   Discharge Date: 3/26/2021     Length of Stay: 4  Code Status:  Full Code  Admitting Physician: Beryle Aas, MD  Discharge Physician: DIXON Chen - NP     Active Discharge Diagnoses:     Hospital Problem Lists:  Principal Problem:    Acute cystitis with hematuria  Active Problems:    History of kidney cancer    Chronic kidney disease    CAD (coronary artery disease)    Depression    Hyperlipidemia    Hypertension    H/O ulcerative colitis    PTSD (post-traumatic stress disorder)    S/P lobectomy of lung    Hematoma versus mass of urinary bladder determined by ultrasound    Hemorrhagic cystitis  Resolved Problems:    * No resolved hospital problems.  *      Admission Condition:  fair     Discharged Condition: good    Hospital Stay:     Hospital Course:  Malik Newman is a 79 y.o. male who was admitted for the management of  Acute cystitis with hematuria , presented to ER with Abdominal Pain (onset 1000) and Hematuria    3/22 - Patient reports that at approximately 10 AM he went to urinate and appreciated a large amount of hematuria including mary blood as well as large clots.  Shortly thereafter patient developed suprapubic abdominal pain that he rated at severe and would come and go in waves.  Patient reported the emergency department where a CT scan was completed and questionable bladder hematoma versus mass was identified.  Stone catheter was placed and patient was placed on three-way irrigation.  Urinalysis reveals UTI.      3/23 -Little change in condition. Three-way Stone catheter remains in place with bloody return. Blood and urine cultures negative thus far. We await urology direction on hospital course.     3/24 -condition improved. Bladder irrigation continues per urology. Return is pink with less clots today. No pain or distention reported.     3/25 -condition again improved. Bladder irrigation continues. Urine clear yellow today. Plans for MRI abdomen pelvis.     3/26 -condition stable. Irrigation continues. Imaging reviewed. Patient will undergo cystoscopy with likely cauterization today. Hold aspirin for at least 1 week. Follow-up with urology at [place] as recommended.     Significant therapeutic interventions: Bladder irrigation, cystoscopy x2, IV antibiotics, abdominal MRI    Significant Diagnostic Studies:   Labs / Micro:  CBC:   Lab Results   Component Value Date    WBC 7.7 03/26/2021    RBC 3.09 03/26/2021    HGB 9.8 03/26/2021    HCT 29.1 03/26/2021    MCV 94.2 03/26/2021    MCH 31.7 03/26/2021    MCHC 33.7 03/26/2021    RDW 12.4 03/26/2021     03/26/2021     BMP:    Lab Results   Component Value Date    GLUCOSE 111 03/26/2021     03/26/2021    K 4.1 03/26/2021     03/26/2021    CO2 23 03/26/2021    ANIONGAP 9 03/26/2021    BUN 10 03/26/2021    CREATININE 0.81 03/26/2021    BUNCRER 12 03/26/2021    CALCIUM urologic conditions. Diet: renal diet    Activity: As tolerated    Instructions to Patient: Stop taking your aspirin for at least the next 10 days. Speak with your primary provider and urologist on 3/31 as scheduled about reinitiation. Complete the oral antibiotics exactly as prescribed. Discharge Medications:      Medication List      START taking these medications    ciprofloxacin 500 MG tablet  Commonly known as: CIPRO  Take 1 tablet by mouth 2 times daily for 4 days        CONTINUE taking these medications    ascorbic acid 500 MG tablet  Commonly known as: VITAMIN C     colestipol 1 g tablet  Commonly known as: COLESTID     doxazosin 2 MG tablet  Commonly known as: CARDURA     fish oil 1000 MG Cpdr     lisinopril 20 MG tablet  Commonly known as: PRINIVIL;ZESTRIL     metoprolol tartrate 50 MG tablet  Commonly known as: LOPRESSOR     MULTIVITAL PO     PARoxetine 40 MG tablet  Commonly known as: PAXIL     Sulfazine 500 MG tablet  Generic drug: sulfaSALAzine     traZODone 100 MG tablet  Commonly known as: DESYREL     vitamin D 25 MCG (1000 UT) Tabs tablet  Commonly known as: CHOLECALCIFEROL        STOP taking these medications    aspirin 81 MG EC tablet           Where to Get Your Medications      These medications were sent to [facility], [place] - [address] [facility] [phone] - F [phone]  [address] [facility] [address], [address]    Phone: [phone]   · ciprofloxacin 500 MG tablet         No discharge procedures on file. Time Spent on discharge is  38 mins in patient examination, evaluation, counseling as well as medication reconciliation, prescriptions for required medications, discharge plan and follow up. Electronically signed by   DIXON Antunez NP  3/26/2021  2:08 PM      Thank you Dr. Rubi Murphy primary care provider on file. for the opportunity to be involved in this patient's care.

## 2021-03-26 NOTE — PROGRESS NOTES
[facility]  Office: [address], DO, Rick Cordon, DO, Mayelin Espinal, DO, [facility], DO, Myla Vaughn MD, Karolyn Heck MD, Irving Pham MD, Slim Bateman MD, Reinier Rogers MD, Yakov Ortiz MD, Mona Vega MD, Patricio Bumpers, MD, Mahnaz Jalloh MD, Danile Fitzpatrick DO, Maddy Long MD, Balaji Armenta DO, Scott Trivedi MD,  [facility], DO, Manuel Esquivel MD, Yamileth Garcia MD, Kan[facility], [facility] Lily, CNP, Brunilda Coe CNP, [place], CNS, Judie Morales CNP, Ramila Hendricks CNP, Mady Teran, CNP, Jess Mallory, CNP, Inocencia Clifton CNP, Laura Pastrana PA-C, Marcello Kayser, [facility] [place], Dell Martinez CNP, [place] Macadam, [facility], Paul [facility], Shekhar Swenson CNP, Felix Miner CNP, Scotty Campoverde [facility]    Progress Note    3/26/2021    9:57 AM    Name:   Avery Magallon  MRN:     [id number]     Acct:      [de-identified]   Room:   2022/2022-01   Day:  4  Admit Date:  3/22/2021  1:56 PM    PCP:   No primary care provider on file. Code Status:  Full Code    Subjective:     C/C:   Chief Complaint   Patient presents with    Abdominal Pain     onset 1000    Hematuria     Interval History Status: Improved. Patient is without complaint today. Bladder irrigation continues and urine return is clear, faint pink tinge, without sediment or clots at the time my exam.  Urology has reviewed imaging studies and case is discussed at length with them. Patient is scheduled to undergo cystoscopy today with likely cauterization. Provided this is successful patient be discharged and recommend outpatient follow-up with the [address] [place] [address]. Brief History:     3/22 - Patient reports that at approximately 10 AM he went to urinate and appreciated a large amount of hematuria including mary blood as well as large clots.   Shortly thereafter patient developed suprapubic abdominal pain that he rated at severe and would come and go in waves. Patient reported the emergency department where a CT scan was completed and questionable bladder hematoma versus mass was identified. Stone catheter was placed and patient was placed on three-way irrigation. Urinalysis reveals UTI.     3/23 -Little change in condition. Three-way Stone catheter remains in place with bloody return. Blood and urine cultures negative thus far. We await urology direction on hospital course. 3/24 -condition improved. Bladder irrigation continues per urology. Return is pink with less clots today. No pain or distention reported. 3/25 -condition again improved. Bladder irrigation continues. Urine clear yellow today. Plans for MRI abdomen pelvis. 3/26 -condition stable. Irrigation continues. Imaging reviewed. Patient will undergo cystoscopy with likely cauterization today. Hold aspirin and Plavix for at least 1 week. Follow-up with urology at [address] as recommended. Review of Systems:     Constitutional:  negative for chills, fevers, sweats  Respiratory:  negative for cough, dyspnea on exertion, shortness of breath, wheezing  Cardiovascular:  negative for chest pain, chest pressure/discomfort, lower extremity edema, palpitations  Gastrointestinal:  negative for abdominal pain, constipation, diarrhea, nausea, vomiting. Does endorse intermittent suprapubic pain when fully return becomes occluded. Neurological:  negative for dizziness, headache    Medications:      Allergies:  No Known Allergies    Current Meds:   Scheduled Meds:    lisinopril  20 mg Oral Daily    metoprolol tartrate  50 mg Oral BID    PARoxetine  40 mg Oral QAM    sulfaSALAzine  1,000 mg Oral BID    doxazosin  1 mg Oral Nightly    sodium chloride flush  10 mL Intravenous 2 times per day    cefTRIAXone (ROCEPHIN) IV  1,000 mg Intravenous Q24H     Continuous Infusions:    sodium chloride 75 mL/hr at 03/26/21 0417     PRN Meds: lidocaine, lidocaine, sodium chloride flush, potassium chloride **OR** potassium alternative oral replacement **OR** potassium chloride, magnesium sulfate, promethazine **OR** ondansetron, polyethylene glycol, nicotine, acetaminophen **OR** acetaminophen, morphine    Data:     Past Medical History:   has a past medical history of Arthritis, CAD (coronary artery disease), Cancer ([address] Chronic kidney disease, Depression, History of kidney cancer, Hyperlipidemia, and Hypertension. Social History:   reports that he has been smoking. He has been smoking about 1.00 pack per day. He has never used smokeless tobacco. He reports current alcohol use. He reports that he does not use drugs. Family History:   Family History   Problem Relation Age of Onset    High Blood Pressure Mother     Coronary Art Dis Father     Heart Attack Father     High Blood Pressure Father     Diabetes Father        Vitals:  /60   Pulse 60   Temp 97.3 °F (36.3 °C) (Oral)   Resp 18   Ht 5' 6\" (1.676 m)   Wt 207 lb (93.9 kg)   SpO2 95%   BMI 33.41 kg/m²   Temp (24hrs), Av.5 °F (36.4 °C), Min:97.3 °F (36.3 °C), Max:97.7 °F (36.5 °C)    No results for input(s): POCGLU in the last 72 hours. I/O (24Hr):     Intake/Output Summary (Last 24 hours) at 3/26/2021 0957  Last data filed at 3/26/2021 8474  Gross per 24 hour   Intake 2993.75 ml   Output 4804 ml   Net -1810.25 ml       Labs:  Hematology:  Recent Labs     21  0827 21  0821   WBC 10.4 7.7   RBC 3.46* 3.09*   HGB 11.0* 9.8*   HCT 32.6* 29.1*   MCV 94.2 94.2   MCH 31.8 31.7   MCHC 33.7 33.7   RDW 12.4 12.4    201   MPV 8.6 9.4   INR  --  1.0     Chemistry:  Recent Labs     21  0827 21  0821    142   K 4.0 4.1    110*   CO2 22 23   GLUCOSE 111* 111*   BUN 12 10   CREATININE 0.77 0.81   ANIONGAP 12 9   LABGLOM >60 >60   GFRAA >60 >60   CALCIUM 8.7 8.7   No results for input(s): PROT, LABALBU, LABA1C, P1MKWSD, Y9TSGZQ, FT4, TSH, AST, ALT, LDH, GGT, ALKPHOS, LABGGT, BILITOT, BILIDIR, AMMONIA, AMYLASE, LIPASE, LACTATE, CHOL, HDL, LDLCHOLESTEROL, CHOLHDLRATIO, TRIG, VLDL, MXI11HV, PHENYTOIN, PHENYF, URICACID, POCGLU in the last 72 hours. ABG:No results found for: POCPH, PHART, PH, POCPCO2, LGJ0UKH, PCO2, POCPO2, PO2ART, PO2, POCHCO3, FMO9GRW, HCO3, NBEA, PBEA, BEART, BE, THGBART, THB, SDS1TWD, OEIZ2AQS, L2YBVADZ, O2SAT, FIO2  Lab Results   Component Value Date/Time    SPECIAL LT [facility] 03/22/2021 07:16 PM     Lab Results   Component Value Date/Time    CULTURE NO GROWTH 4 DAYS 03/22/2021 07:16 PM       Radiology:  Ct Abdomen Pelvis Wo Contrast Additional Contrast? None    Result Date: 3/22/2021  1. Solitary right kidney demonstrating moderate hydronephrosis and mild hydroureter without obstructing ureteric lesion. 2. A swirling moderately hyperdense material in the urinary bladder about 8 x 8 cm in the dependent portion. Hematoma versus mass with hemorrhage. 3. Mild prostatomegaly. Physical Examination:        General appearance:  alert, cooperative and no distress  Mental Status:  oriented to person, place and time and normal affect  Lungs:  clear to auscultation bilaterally, normal effort  Heart:  regular rate and rhythm, no murmur  Abdomen:  soft, nontender, nondistended, normal bowel sounds, no masses, hepatomegaly, splenomegaly. Genitourinary:     Comments: Three-way Stone catheter is in place. No external bleeding noted around the urethra. Stone bag contains clear yellow urine without sediment or clots.   Extremities:  no edema, redness, tenderness in the calves  Skin:  no gross lesions, rashes, induration    Assessment:        Hospital Problems           Last Modified POA    * (Principal) Acute cystitis with hematuria 3/22/2021 Yes    History of kidney cancer 3/22/2021 Yes    Chronic kidney disease 3/22/2021 Yes    CAD (coronary artery disease) 3/22/2021 Yes    Depression 3/22/2021 Yes    Hyperlipidemia 3/22/2021 Yes    Hypertension 3/22/2021 Yes    H/O ulcerative colitis 3/22/2021 Yes    PTSD (post-traumatic stress disorder) 3/22/2021 Yes    S/P lobectomy of lung 3/22/2021 Yes    Hematoma versus mass of urinary bladder determined by ultrasound 3/22/2021 Yes    Hemorrhagic cystitis 3/24/2021 Yes          Plan:        1. Acute cystitis with hematuria and urinary retention  1. IV hydration with IV Rocephin every 24 hours  2. Maintain three-way Stone catheter and irrigation as directed by urology -urine clear and yellow at the time my exam, pink-tinged  3. Urology plans for cystoscopy with cauterization this morning  4. Await further urology direction on hospital course but possible discharge this afternoon  2. CKD  1. Closely monitor renal function -stable  3. Depression and PTSD  1. Paxil -stable  4. Hypertension   1.  Lisinopril, metoprolol -stable    DIXON Cavazos - NP  3/26/2021  9:57 AM

## 2021-03-26 NOTE — PLAN OF CARE
Problem: Safety:  Goal: Free from accidental physical injury  Description: Free from accidental physical injury  Outcome: Ongoing     Problem: Daily Care:  Goal: Daily care needs are met  Description: Daily care needs are met  Outcome: Ongoing     Problem: Pain:  Goal: Patient's pain/discomfort is manageable  Description: Patient's pain/discomfort is manageable  Outcome: Ongoing     Problem: Discharge Planning:  Goal: Patients continuum of care needs are met  Description: Patients continuum of care needs are met  Outcome: Ongoing

## 2021-03-26 NOTE — PLAN OF CARE
Problem: Safety:  Goal: Free from accidental physical injury  Description: Free from accidental physical injury  3/26/2021 1053 by Corbin Jacques RN  Outcome: Ongoing  Note: Gait steady in room, calls for assist as needed  3/26/2021 0157 by Isael Pond RN  Outcome: Ongoing     Problem: Bleeding:  Goal: Will show no signs and symptoms of excessive bleeding  Description: Will show no signs and symptoms of excessive bleeding  Outcome: Ongoing  Note: Bleeding minimal with CBI

## 2021-03-26 NOTE — ANESTHESIA PRE PROCEDURE
Department of Anesthesiology  Preprocedure Note       Name:  Oral Marking   Age:  79 y.o.  :  1950                                          MRN:  [id number]         Date:  3/26/2021      Surgeon: Aiden Hewitt):  Briseida Castillo MD    Procedure: Procedure(s):  CYSTOSCOPY   CAUTERIZATION OF BLEEDERS POSSIBLE BIOPSY    Medications prior to admission:   Prior to Admission medications    Medication Sig Start Date End Date Taking?  Authorizing Provider   ascorbic acid (VITAMIN C) 500 MG tablet Take 500 mg by mouth every morning   Yes Historical Provider, MD   vitamin D (CHOLECALCIFEROL) 25 MCG (1000 UT) TABS tablet Take 1,000 Units by mouth daily   Yes Historical Provider, MD   traZODone (DESYREL) 100 MG tablet Take 100 mg by mouth nightly   Yes Historical Provider, MD   aspirin 81 MG EC tablet Take 81 mg by mouth daily   Yes Historical Provider, MD   Omega-3 Fatty Acids (FISH OIL) 1000 MG CPDR Take 1,000 mg by mouth 2 times daily    Yes Historical Provider, MD   PARoxetine (PAXIL) 40 MG tablet Take 40 mg by mouth every morning   Yes Historical Provider, MD   metoprolol tartrate (LOPRESSOR) 50 MG tablet Take 50 mg by mouth 2 times daily   Yes Historical Provider, MD   lisinopril (PRINIVIL;ZESTRIL) 20 MG tablet Take 20 mg by mouth daily   Yes Historical Provider, MD   sulfaSALAzine (SULFAZINE) 500 MG tablet Take 1,000 mg by mouth 2 times daily    Yes Historical Provider, MD   colestipol (COLESTID) 1 g tablet Take 5 g by mouth 2 times daily Take 5 tablets twice per day   Yes Historical Provider, MD   doxazosin (CARDURA) 2 MG tablet Take 2 mg by mouth nightly    Yes Historical Provider, MD   Multiple Vitamins-Minerals (MULTIVITAL PO) Take 1 tablet by mouth daily    Yes Historical Provider, MD       Current medications:    Current Facility-Administered Medications   Medication Dose Route Frequency Provider Last Rate Last Admin    lidocaine (XYLOCAINE) 2 % uro-jet   Topical PRN Briseida Castillo MD   Given at 21 (!) 131/55  137/60   Pulse: 63 67  60   Resp: 16 16  18   Temp: 97.9 °F (36.6 °C) 97.7 °F (36.5 °C)  97.3 °F (36.3 °C)   TempSrc: Oral Oral  Oral   SpO2: 96% 96%  95%   Weight:   207 lb (93.9 kg)    Height:                                                  BP Readings from Last 3 Encounters:   03/26/21 137/60       NPO Status: Time of last liquid consumption: 0530                        Time of last solid consumption: 2300                        Date of last liquid consumption: 03/26/21                        Date of last solid food consumption: 03/25/21    BMI:   Wt Readings from Last 3 Encounters:   03/26/21 207 lb (93.9 kg)     Body mass index is 33.41 kg/m². CBC:   Lab Results   Component Value Date    WBC 7.7 03/26/2021    RBC 3.09 03/26/2021    HGB 9.8 03/26/2021    HCT 29.1 03/26/2021    MCV 94.2 03/26/2021    RDW 12.4 03/26/2021     03/26/2021       CMP:   Lab Results   Component Value Date     03/26/2021    K 4.1 03/26/2021     03/26/2021    CO2 23 03/26/2021    BUN 10 03/26/2021    CREATININE 0.81 03/26/2021    GFRAA >60 03/26/2021    LABGLOM >60 03/26/2021    GLUCOSE 111 03/26/2021    CALCIUM 8.7 03/26/2021       POC Tests: No results for input(s): POCGLU, POCNA, POCK, POCCL, POCBUN, POCHEMO, POCHCT in the last 72 hours.     Coags:   Lab Results   Component Value Date    PROTIME 12.8 03/26/2021    INR 1.0 03/26/2021       HCG (If Applicable): No results found for: PREGTESTUR, PREGSERUM, HCG, HCGQUANT     ABGs: No results found for: PHART, PO2ART, SRH3TPE, LXM6IZP, BEART, O2TSKUNQ     Type & Screen (If Applicable):  No results found for: LABABO, LABRH    Drug/Infectious Status (If Applicable):  No results found for: HIV, HEPCAB    COVID-19 Screening (If Applicable):   Lab Results   Component Value Date    COVID19 Not Detected 03/23/2021           Anesthesia Evaluation  Patient summary reviewed and Nursing notes reviewed no history of anesthetic complications:   Airway: Mallampati: II  TM distance: >3 FB   Neck ROM: full  Mouth opening: > = 3 FB Dental: normal exam         Pulmonary:normal exam    (+) sleep apnea: on CPAP,      (-) COPD                           Cardiovascular:  Exercise tolerance: no interval change,   (+) hypertension:, CAD:, CABG/stent: no interval change, hyperlipidemia          Rate: normal                    Neuro/Psych:               GI/Hepatic/Renal:        (-) GERD       Endo/Other:        (-) diabetes mellitus               Abdominal:           Vascular:                                        Anesthesia Plan      general     ASA 3       Induction: intravenous. Anesthetic plan and risks discussed with patient. Plan discussed with CRNA.     Attending anesthesiologist reviewed and agrees with Pre Eval content              Wily Dubois DO   3/26/2021

## 2021-03-26 NOTE — PROGRESS NOTES
1240, returned from PACU awake, alert. Denies discomfort. No meatal drainage seen.  Family at bedside

## 2021-03-26 NOTE — PROGRESS NOTES
Physical Therapy  DATE: 3/26/2021    NAME: Jabari Brown  MRN: [id number]   : 1950    Patient not seen this date for Physical Therapy due to:  [] Blood transfusion in progress  [] Cancel by RN  [] Hemodialysis  []  Refusal by Patient   [] Spine Precautions   [] Strict Bedrest  [x] Surgery/Procedure (Patient out of the room for a Cysto)  [] Testing      [] Other        [] PT being discontinued at this time. Patient independent. No further needs. [] PT being discontinued at this time as the patient has been transferred to hospice care. No further needs.     Satish Machado, PTA

## 2021-03-26 NOTE — PROGRESS NOTES
Voided 200 ml urine at 1500, bladder scanned for 474ml.  Encouraged to get up to ambulate and will rescan  After next voiding

## 2021-03-26 NOTE — PROGRESS NOTES
Occupational Therapy    DATE: 3/26/2021    NAME: Louann Adler  MRN: [id number]   : 1950      [facility]  Occupational Therapy Not Seen Note    Patient not available for Occupational Therapy due to:    [] Testing:    [] Hemodialysis    [] Cancelled by RN:    []Refusal by Patient:    [x] Surgery: cysto    [] Intubation:      [] Pain Medication:    [] Sedation:     [] Spine Precautions :    [] Medical Instability:    [] Other:      Coleman Marisa, CHU/L

## 2021-03-28 LAB
CULTURE: NORMAL
Lab: NORMAL
SPECIMEN DESCRIPTION: NORMAL

## 2021-03-30 LAB — UROTHELIAL CANCER DETECTION: NORMAL

## (undated) DEVICE — SYRINGE MED 50ML LUERLOCK TIP

## (undated) DEVICE — CATHETER,FOLEY,3WAY,SILI-ELAST,22FR,30ML: Brand: MEDLINE

## (undated) DEVICE — Device

## (undated) DEVICE — CONTAINER,SPECIMEN,OR STERILE,4OZ: Brand: MEDLINE

## (undated) DEVICE — DRAINBAG,ANTI-REFLUX TOWER,L/F,2000ML,LL: Brand: MEDLINE

## (undated) DEVICE — GLOVE SURG SZ 7 CRM LTX FREE POLYISOPRENE POLYMER BEAD ANTI

## (undated) DEVICE — Z DUP USE 2522782 SOLUTION IRRIG 1000ML STRL H2O PLAS CONTAINER UROMATIC